# Patient Record
Sex: MALE | Race: WHITE | NOT HISPANIC OR LATINO | Employment: FULL TIME | URBAN - METROPOLITAN AREA
[De-identification: names, ages, dates, MRNs, and addresses within clinical notes are randomized per-mention and may not be internally consistent; named-entity substitution may affect disease eponyms.]

---

## 2018-12-07 ENCOUNTER — LAB (OUTPATIENT)
Dept: LAB | Facility: HOSPITAL | Age: 48
End: 2018-12-07

## 2018-12-07 ENCOUNTER — TRANSCRIBE ORDERS (OUTPATIENT)
Dept: ADMINISTRATIVE | Facility: HOSPITAL | Age: 48
End: 2018-12-07

## 2018-12-07 DIAGNOSIS — Z00.00 ROUTINE GENERAL MEDICAL EXAMINATION AT A HEALTH CARE FACILITY: ICD-10-CM

## 2018-12-07 DIAGNOSIS — Z12.5 SPECIAL SCREENING FOR MALIGNANT NEOPLASM OF PROSTATE: ICD-10-CM

## 2018-12-07 DIAGNOSIS — Z00.00 ROUTINE GENERAL MEDICAL EXAMINATION AT A HEALTH CARE FACILITY: Primary | ICD-10-CM

## 2018-12-07 LAB
ALBUMIN SERPL-MCNC: 4.9 G/DL (ref 3.5–5.2)
ALBUMIN/GLOB SERPL: 2.2 G/DL
ALP SERPL-CCNC: 74 U/L (ref 39–117)
ALT SERPL W P-5'-P-CCNC: 60 U/L (ref 1–41)
ANION GAP SERPL CALCULATED.3IONS-SCNC: 13.5 MMOL/L
AST SERPL-CCNC: 54 U/L (ref 1–40)
BASOPHILS # BLD AUTO: 0.02 10*3/MM3 (ref 0–0.2)
BASOPHILS NFR BLD AUTO: 0.3 % (ref 0–1.5)
BILIRUB SERPL-MCNC: 0.6 MG/DL (ref 0.1–1.2)
BILIRUB UR QL STRIP: NEGATIVE
BUN BLD-MCNC: 14 MG/DL (ref 6–20)
BUN/CREAT SERPL: 15.1 (ref 7–25)
CALCIUM SPEC-SCNC: 9.9 MG/DL (ref 8.6–10.5)
CHLORIDE SERPL-SCNC: 95 MMOL/L (ref 98–107)
CHOLEST SERPL-MCNC: 203 MG/DL (ref 0–200)
CLARITY UR: ABNORMAL
CO2 SERPL-SCNC: 25.5 MMOL/L (ref 22–29)
COLOR UR: ABNORMAL
CREAT BLD-MCNC: 0.93 MG/DL (ref 0.76–1.27)
DEPRECATED RDW RBC AUTO: 45.2 FL (ref 37–54)
EOSINOPHIL # BLD AUTO: 0.31 10*3/MM3 (ref 0–0.7)
EOSINOPHIL NFR BLD AUTO: 4.8 % (ref 0.3–6.2)
ERYTHROCYTE [DISTWIDTH] IN BLOOD BY AUTOMATED COUNT: 12.6 % (ref 11.5–14.5)
GFR SERPL CREATININE-BSD FRML MDRD: 86 ML/MIN/1.73
GLOBULIN UR ELPH-MCNC: 2.2 GM/DL
GLUCOSE BLD-MCNC: 148 MG/DL (ref 65–99)
GLUCOSE UR STRIP-MCNC: NEGATIVE MG/DL
HCT VFR BLD AUTO: 49.8 % (ref 40.4–52.2)
HDLC SERPL-MCNC: 79 MG/DL (ref 40–60)
HGB BLD-MCNC: 16.4 G/DL (ref 13.7–17.6)
HGB UR QL STRIP.AUTO: NEGATIVE
IMM GRANULOCYTES # BLD: 0 10*3/MM3 (ref 0–0.03)
IMM GRANULOCYTES NFR BLD: 0 % (ref 0–0.5)
KETONES UR QL STRIP: ABNORMAL
LDLC SERPL CALC-MCNC: 113 MG/DL (ref 0–100)
LDLC/HDLC SERPL: 1.43 {RATIO}
LEUKOCYTE ESTERASE UR QL STRIP.AUTO: NEGATIVE
LYMPHOCYTES # BLD AUTO: 1.56 10*3/MM3 (ref 0.9–4.8)
LYMPHOCYTES NFR BLD AUTO: 24.2 % (ref 19.6–45.3)
MCH RBC QN AUTO: 32 PG (ref 27–32.7)
MCHC RBC AUTO-ENTMCNC: 32.9 G/DL (ref 32.6–36.4)
MCV RBC AUTO: 97.1 FL (ref 79.8–96.2)
MONOCYTES # BLD AUTO: 0.75 10*3/MM3 (ref 0.2–1.2)
MONOCYTES NFR BLD AUTO: 11.6 % (ref 5–12)
NEUTROPHILS # BLD AUTO: 3.8 10*3/MM3 (ref 1.9–8.1)
NEUTROPHILS NFR BLD AUTO: 59.1 % (ref 42.7–76)
NITRITE UR QL STRIP: NEGATIVE
PH UR STRIP.AUTO: 7.5 [PH] (ref 5–8)
PLATELET # BLD AUTO: 234 10*3/MM3 (ref 140–500)
PMV BLD AUTO: 9.1 FL (ref 6–12)
POTASSIUM BLD-SCNC: 4.7 MMOL/L (ref 3.5–5.2)
PROT SERPL-MCNC: 7.1 G/DL (ref 6–8.5)
PROT UR QL STRIP: NEGATIVE
PSA SERPL-MCNC: 1.06 NG/ML (ref 0–4)
RBC # BLD AUTO: 5.13 10*6/MM3 (ref 4.6–6)
SODIUM BLD-SCNC: 134 MMOL/L (ref 136–145)
SP GR UR STRIP: 1.02 (ref 1–1.03)
TRIGL SERPL-MCNC: 55 MG/DL (ref 0–150)
UROBILINOGEN UR QL STRIP: ABNORMAL
VLDLC SERPL-MCNC: 11 MG/DL (ref 5–40)
WBC NRBC COR # BLD: 6.44 10*3/MM3 (ref 4.5–10.7)

## 2018-12-07 PROCEDURE — 85025 COMPLETE CBC W/AUTO DIFF WBC: CPT | Performed by: INTERNAL MEDICINE

## 2018-12-07 PROCEDURE — 80053 COMPREHEN METABOLIC PANEL: CPT | Performed by: INTERNAL MEDICINE

## 2018-12-07 PROCEDURE — 80061 LIPID PANEL: CPT | Performed by: INTERNAL MEDICINE

## 2018-12-07 PROCEDURE — 36415 COLL VENOUS BLD VENIPUNCTURE: CPT

## 2018-12-07 PROCEDURE — 81003 URINALYSIS AUTO W/O SCOPE: CPT | Performed by: INTERNAL MEDICINE

## 2018-12-07 PROCEDURE — G0103 PSA SCREENING: HCPCS | Performed by: INTERNAL MEDICINE

## 2019-04-19 ENCOUNTER — PREP FOR SURGERY (OUTPATIENT)
Dept: OTHER | Facility: HOSPITAL | Age: 49
End: 2019-04-19

## 2019-04-19 DIAGNOSIS — Z12.11 SCREEN FOR COLON CANCER: Primary | ICD-10-CM

## 2019-04-30 ENCOUNTER — TRANSCRIBE ORDERS (OUTPATIENT)
Dept: ADMINISTRATIVE | Facility: HOSPITAL | Age: 49
End: 2019-04-30

## 2019-04-30 ENCOUNTER — LAB (OUTPATIENT)
Dept: LAB | Facility: HOSPITAL | Age: 49
End: 2019-04-30

## 2019-04-30 DIAGNOSIS — E78.5 HYPERLIPIDEMIA, UNSPECIFIED HYPERLIPIDEMIA TYPE: Primary | ICD-10-CM

## 2019-04-30 DIAGNOSIS — E78.5 HYPERLIPIDEMIA, UNSPECIFIED HYPERLIPIDEMIA TYPE: ICD-10-CM

## 2019-04-30 LAB
ALBUMIN SERPL-MCNC: 4.4 G/DL (ref 3.5–5.2)
ALBUMIN/GLOB SERPL: 1.8 G/DL
ALP SERPL-CCNC: 66 U/L (ref 39–117)
ALT SERPL W P-5'-P-CCNC: 44 U/L (ref 1–41)
ANION GAP SERPL CALCULATED.3IONS-SCNC: 10.7 MMOL/L
AST SERPL-CCNC: 37 U/L (ref 1–40)
BILIRUB SERPL-MCNC: 0.4 MG/DL (ref 0.2–1.2)
BUN BLD-MCNC: 13 MG/DL (ref 6–20)
BUN/CREAT SERPL: 15.5 (ref 7–25)
CALCIUM SPEC-SCNC: 9.8 MG/DL (ref 8.6–10.5)
CHLORIDE SERPL-SCNC: 101 MMOL/L (ref 98–107)
CHOLEST SERPL-MCNC: 199 MG/DL (ref 0–200)
CO2 SERPL-SCNC: 27.3 MMOL/L (ref 22–29)
CREAT BLD-MCNC: 0.84 MG/DL (ref 0.76–1.27)
GFR SERPL CREATININE-BSD FRML MDRD: 96 ML/MIN/1.73
GLOBULIN UR ELPH-MCNC: 2.4 GM/DL
GLUCOSE BLD-MCNC: 177 MG/DL (ref 65–99)
HDLC SERPL-MCNC: 76 MG/DL (ref 40–60)
LDLC SERPL CALC-MCNC: 112 MG/DL (ref 0–100)
LDLC/HDLC SERPL: 1.47 {RATIO}
POTASSIUM BLD-SCNC: 4.5 MMOL/L (ref 3.5–5.2)
PROT SERPL-MCNC: 6.8 G/DL (ref 6–8.5)
SODIUM BLD-SCNC: 139 MMOL/L (ref 136–145)
TRIGL SERPL-MCNC: 57 MG/DL (ref 0–150)
VLDLC SERPL-MCNC: 11.4 MG/DL (ref 5–40)

## 2019-04-30 PROCEDURE — 36415 COLL VENOUS BLD VENIPUNCTURE: CPT

## 2019-04-30 PROCEDURE — 80053 COMPREHEN METABOLIC PANEL: CPT | Performed by: INTERNAL MEDICINE

## 2019-04-30 PROCEDURE — 80061 LIPID PANEL: CPT | Performed by: INTERNAL MEDICINE

## 2019-05-06 PROBLEM — Z12.11 SCREEN FOR COLON CANCER: Status: ACTIVE | Noted: 2019-05-06

## 2019-05-07 ENCOUNTER — LAB (OUTPATIENT)
Dept: LAB | Facility: HOSPITAL | Age: 49
End: 2019-05-07

## 2019-05-07 ENCOUNTER — TRANSCRIBE ORDERS (OUTPATIENT)
Dept: ADMINISTRATIVE | Facility: HOSPITAL | Age: 49
End: 2019-05-07

## 2019-05-07 DIAGNOSIS — E10.40 DIABETIC NEUROPATHY, TYPE I DIABETES MELLITUS (HCC): Primary | ICD-10-CM

## 2019-05-07 DIAGNOSIS — E10.40 DIABETIC NEUROPATHY, TYPE I DIABETES MELLITUS (HCC): ICD-10-CM

## 2019-05-07 LAB — HBA1C MFR BLD: 7.62 % (ref 4.8–5.6)

## 2019-05-07 PROCEDURE — 83036 HEMOGLOBIN GLYCOSYLATED A1C: CPT | Performed by: INTERNAL MEDICINE

## 2019-05-07 PROCEDURE — 36415 COLL VENOUS BLD VENIPUNCTURE: CPT

## 2019-06-28 ENCOUNTER — ANESTHESIA (OUTPATIENT)
Dept: GASTROENTEROLOGY | Facility: HOSPITAL | Age: 49
End: 2019-06-28

## 2019-06-28 ENCOUNTER — ANESTHESIA EVENT (OUTPATIENT)
Dept: GASTROENTEROLOGY | Facility: HOSPITAL | Age: 49
End: 2019-06-28

## 2019-06-28 ENCOUNTER — HOSPITAL ENCOUNTER (OUTPATIENT)
Facility: HOSPITAL | Age: 49
Setting detail: HOSPITAL OUTPATIENT SURGERY
Discharge: HOME | End: 2019-06-28
Attending: INTERNAL MEDICINE | Admitting: INTERNAL MEDICINE

## 2019-06-28 VITALS
SYSTOLIC BLOOD PRESSURE: 133 MMHG | BODY MASS INDEX: 27.44 KG/M2 | WEIGHT: 225.31 LBS | HEART RATE: 73 BPM | DIASTOLIC BLOOD PRESSURE: 77 MMHG | TEMPERATURE: 98.2 F | RESPIRATION RATE: 16 BRPM | HEIGHT: 76 IN | OXYGEN SATURATION: 97 %

## 2019-06-28 DIAGNOSIS — Z12.11 SCREEN FOR COLON CANCER: ICD-10-CM

## 2019-06-28 LAB — GLUCOSE BLDC GLUCOMTR-MCNC: 215 MG/DL (ref 70–130)

## 2019-06-28 PROCEDURE — 45380 COLONOSCOPY AND BIOPSY: CPT | Performed by: INTERNAL MEDICINE

## 2019-06-28 PROCEDURE — 25010000002 PROPOFOL 10 MG/ML EMULSION: Performed by: NURSE ANESTHETIST, CERTIFIED REGISTERED

## 2019-06-28 PROCEDURE — 82962 GLUCOSE BLOOD TEST: CPT

## 2019-06-28 PROCEDURE — 88305 TISSUE EXAM BY PATHOLOGIST: CPT | Performed by: INTERNAL MEDICINE

## 2019-06-28 PROCEDURE — S0260 H&P FOR SURGERY: HCPCS | Performed by: INTERNAL MEDICINE

## 2019-06-28 RX ORDER — LIDOCAINE HYDROCHLORIDE 20 MG/ML
INJECTION, SOLUTION INFILTRATION; PERINEURAL AS NEEDED
Status: DISCONTINUED | OUTPATIENT
Start: 2019-06-28 | End: 2019-06-28 | Stop reason: SURG

## 2019-06-28 RX ORDER — SODIUM CHLORIDE, SODIUM LACTATE, POTASSIUM CHLORIDE, CALCIUM CHLORIDE 600; 310; 30; 20 MG/100ML; MG/100ML; MG/100ML; MG/100ML
30 INJECTION, SOLUTION INTRAVENOUS CONTINUOUS PRN
Status: DISCONTINUED | OUTPATIENT
Start: 2019-06-28 | End: 2019-06-28 | Stop reason: HOSPADM

## 2019-06-28 RX ORDER — ROSUVASTATIN CALCIUM 20 MG/1
40 TABLET, COATED ORAL DAILY
COMMUNITY
End: 2019-10-03

## 2019-06-28 RX ORDER — LISINOPRIL 40 MG/1
40 TABLET ORAL DAILY
COMMUNITY

## 2019-06-28 RX ORDER — PROPOFOL 10 MG/ML
VIAL (ML) INTRAVENOUS CONTINUOUS PRN
Status: DISCONTINUED | OUTPATIENT
Start: 2019-06-28 | End: 2019-06-28 | Stop reason: SURG

## 2019-06-28 RX ORDER — PROPOFOL 10 MG/ML
VIAL (ML) INTRAVENOUS AS NEEDED
Status: DISCONTINUED | OUTPATIENT
Start: 2019-06-28 | End: 2019-06-28 | Stop reason: SURG

## 2019-06-28 RX ADMIN — SODIUM CHLORIDE, POTASSIUM CHLORIDE, SODIUM LACTATE AND CALCIUM CHLORIDE 30 ML/HR: 600; 310; 30; 20 INJECTION, SOLUTION INTRAVENOUS at 10:58

## 2019-06-28 RX ADMIN — PROPOFOL 30 MG: 10 INJECTION, EMULSION INTRAVENOUS at 11:08

## 2019-06-28 RX ADMIN — LIDOCAINE HYDROCHLORIDE 40 MG: 20 INJECTION, SOLUTION INFILTRATION; PERINEURAL at 11:06

## 2019-06-28 RX ADMIN — PROPOFOL 50 MG: 10 INJECTION, EMULSION INTRAVENOUS at 11:06

## 2019-06-28 RX ADMIN — PROPOFOL 300 MCG/KG/MIN: 10 INJECTION, EMULSION INTRAVENOUS at 11:06

## 2019-06-28 NOTE — ANESTHESIA POSTPROCEDURE EVALUATION
"Patient: Jet Deras    Procedure Summary     Date:  06/28/19 Room / Location:   [facility]    Anesthesia Start:  1104 Anesthesia Stop:  1129    Procedure:  COLONOSCOPY INTO CECUM AND TI WITH COLD BX POLYPECTOMIES (N/A ) Diagnosis:       Screen for colon cancer      (Screen for colon cancer [Z12.11])    Surgeon:  Yaya Del Valle MD Provider:  Kamlesh Chaudhry MD    Anesthesia Type:  MAC ASA Status:  2          Anesthesia Type: MAC  Last vitals  BP   125/75 (06/28/19 1140)   Temp   36.8 °C (98.2 °F) (06/28/19 1130)   Pulse   76 (06/28/19 1140)   Resp   16 (06/28/19 1140)     SpO2   97 % (06/28/19 1140)     Post Anesthesia Care and Evaluation    Patient location during evaluation: PACU  Patient participation: complete - patient participated  Level of consciousness: awake  Pain score: 0  Pain management: adequate  Airway patency: patent  Anesthetic complications: No anesthetic complications  PONV Status: none  Cardiovascular status: acceptable  Respiratory status: acceptable  Hydration status: acceptable    Comments: /75   Pulse 76   Temp 36.8 °C (98.2 °F)   Resp 16   Ht 193 cm (76\")   Wt 102 kg (225 lb 5 oz)   SpO2 97%   BMI 27.43 kg/m²       "

## 2019-06-28 NOTE — ANESTHESIA PREPROCEDURE EVALUATION
Anesthesia Evaluation     Patient summary reviewed and Nursing notes reviewed   NPO Solid Status: > 8 hours  NPO Liquid Status: > 4 hours           Airway   Mallampati: I  TM distance: >3 FB  Neck ROM: full  No difficulty expected  Dental - normal exam     Pulmonary - negative pulmonary ROS and normal exam   Cardiovascular - negative cardio ROS and normal exam  Exercise tolerance: excellent (>7 METS)        Neuro/Psych- negative ROS  GI/Hepatic/Renal/Endo    (+)   diabetes mellitus,     Musculoskeletal (-) negative ROS    Abdominal  - normal exam    Bowel sounds: normal.   Substance History - negative use     OB/GYN negative ob/gyn ROS         Other                        Anesthesia Plan    ASA 2     MAC     Anesthetic plan, all risks, benefits, and alternatives have been provided, discussed and informed consent has been obtained with: patient.

## 2019-07-01 LAB
CYTO UR: NORMAL
LAB AP CASE REPORT: NORMAL
PATH REPORT.FINAL DX SPEC: NORMAL
PATH REPORT.GROSS SPEC: NORMAL

## 2019-07-09 ENCOUNTER — TELEPHONE (OUTPATIENT)
Dept: GASTROENTEROLOGY | Facility: CLINIC | Age: 49
End: 2019-07-09

## 2019-07-09 NOTE — TELEPHONE ENCOUNTER
Called pt and left  for pt to call back.     C/s placed in recall for 06/28/2020.    Copy of path report faxed to Dr Hawkins thru [facility].

## 2019-07-09 NOTE — TELEPHONE ENCOUNTER
----- Message from Yaya Del Valle MD sent at 7/4/2019  4:43 PM EDT -----  Tell him that the colon polyps that were removed were not cancerous but the cluster of small colon polyps were precancerous. I recommend that the colonoscopy be repeated in one year to make sure that the ascending colon cluster of precanerous polyps was in fact completely removed. FAX to his PCP. Thx. kjh

## 2019-07-16 NOTE — TELEPHONE ENCOUNTER
Returned pt's call .  Pt states he did not listen to his vm. Advised per Dr Del Valle of the results below.  Pt verb understanding.

## 2019-07-16 NOTE — TELEPHONE ENCOUNTER
Returned pt's call and on identified vm advised per Dr Del Valle that the colon polyps that were removed were not cancerous but the cluster of sm colon polyps were preancerous.  He recommends that the c/s be repeated in 1 yr to make sure that the ascending colon cluster of precancerous polyps was in fact completely removed .  Advised to call with questions.

## 2019-09-12 ENCOUNTER — LAB (OUTPATIENT)
Dept: LAB | Facility: HOSPITAL | Age: 49
End: 2019-09-12

## 2019-09-12 ENCOUNTER — TRANSCRIBE ORDERS (OUTPATIENT)
Dept: ADMINISTRATIVE | Facility: HOSPITAL | Age: 49
End: 2019-09-12

## 2019-09-12 DIAGNOSIS — I10 HYPERTENSION, UNSPECIFIED TYPE: ICD-10-CM

## 2019-09-12 DIAGNOSIS — E10.40 DIABETIC NEUROPATHY, TYPE I DIABETES MELLITUS (HCC): Primary | ICD-10-CM

## 2019-09-12 DIAGNOSIS — E78.5 HYPERLIPIDEMIA, UNSPECIFIED HYPERLIPIDEMIA TYPE: ICD-10-CM

## 2019-09-12 DIAGNOSIS — E10.40 DIABETIC NEUROPATHY, TYPE I DIABETES MELLITUS (HCC): ICD-10-CM

## 2019-09-12 LAB
ALBUMIN SERPL-MCNC: 4.5 G/DL (ref 3.5–5.2)
ALBUMIN/GLOB SERPL: 1.7 G/DL
ALP SERPL-CCNC: 72 U/L (ref 39–117)
ALT SERPL W P-5'-P-CCNC: 43 U/L (ref 1–41)
ANION GAP SERPL CALCULATED.3IONS-SCNC: 13.7 MMOL/L (ref 5–15)
AST SERPL-CCNC: 41 U/L (ref 1–40)
BILIRUB SERPL-MCNC: 0.6 MG/DL (ref 0.2–1.2)
BUN BLD-MCNC: 11 MG/DL (ref 6–20)
BUN/CREAT SERPL: 11.8 (ref 7–25)
CALCIUM SPEC-SCNC: 9.9 MG/DL (ref 8.6–10.5)
CHLORIDE SERPL-SCNC: 97 MMOL/L (ref 98–107)
CHOLEST SERPL-MCNC: 191 MG/DL (ref 0–200)
CO2 SERPL-SCNC: 27.3 MMOL/L (ref 22–29)
CREAT BLD-MCNC: 0.93 MG/DL (ref 0.76–1.27)
GFR SERPL CREATININE-BSD FRML MDRD: 86 ML/MIN/1.73
GLOBULIN UR ELPH-MCNC: 2.7 GM/DL
GLUCOSE BLD-MCNC: 219 MG/DL (ref 65–99)
HBA1C MFR BLD: 7.48 % (ref 4.8–5.6)
HDLC SERPL-MCNC: 79 MG/DL (ref 40–60)
LDLC SERPL CALC-MCNC: 98 MG/DL (ref 0–100)
LDLC/HDLC SERPL: 1.24 {RATIO}
POTASSIUM BLD-SCNC: 4.9 MMOL/L (ref 3.5–5.2)
PROT SERPL-MCNC: 7.2 G/DL (ref 6–8.5)
SODIUM BLD-SCNC: 138 MMOL/L (ref 136–145)
TRIGL SERPL-MCNC: 71 MG/DL (ref 0–150)
TSH SERPL DL<=0.05 MIU/L-ACNC: 1.07 UIU/ML (ref 0.27–4.2)
VLDLC SERPL-MCNC: 14.2 MG/DL (ref 5–40)

## 2019-09-12 PROCEDURE — 36415 COLL VENOUS BLD VENIPUNCTURE: CPT

## 2019-09-12 PROCEDURE — 80061 LIPID PANEL: CPT | Performed by: INTERNAL MEDICINE

## 2019-09-12 PROCEDURE — 84443 ASSAY THYROID STIM HORMONE: CPT | Performed by: INTERNAL MEDICINE

## 2019-09-12 PROCEDURE — 83036 HEMOGLOBIN GLYCOSYLATED A1C: CPT | Performed by: INTERNAL MEDICINE

## 2019-09-12 PROCEDURE — 80053 COMPREHEN METABOLIC PANEL: CPT | Performed by: INTERNAL MEDICINE

## 2019-10-03 ENCOUNTER — HOSPITAL ENCOUNTER (OUTPATIENT)
Facility: HOSPITAL | Age: 49
Setting detail: OBSERVATION
Discharge: HOME | End: 2019-10-04
Attending: EMERGENCY MEDICINE | Admitting: INTERNAL MEDICINE

## 2019-10-03 ENCOUNTER — APPOINTMENT (OUTPATIENT)
Dept: GENERAL RADIOLOGY | Facility: HOSPITAL | Age: 49
End: 2019-10-03

## 2019-10-03 DIAGNOSIS — E87.1 HYPONATREMIA: ICD-10-CM

## 2019-10-03 DIAGNOSIS — E10.10 TYPE 1 DIABETES MELLITUS WITH KETOACIDOSIS WITHOUT COMA (HCC): Primary | ICD-10-CM

## 2019-10-03 DIAGNOSIS — T88.7XXA SIDE EFFECT OF DRUG: ICD-10-CM

## 2019-10-03 DIAGNOSIS — E87.5 HYPERKALEMIA: ICD-10-CM

## 2019-10-03 LAB
ALBUMIN SERPL-MCNC: 4.6 G/DL (ref 3.5–5.2)
ALBUMIN/GLOB SERPL: 1.7 G/DL
ALP SERPL-CCNC: 92 U/L (ref 39–117)
ALT SERPL W P-5'-P-CCNC: 52 U/L (ref 1–41)
ANION GAP SERPL CALCULATED.3IONS-SCNC: 32.4 MMOL/L (ref 5–15)
ANION GAP SERPL CALCULATED.3IONS-SCNC: 32.5 MMOL/L (ref 5–15)
ANION GAP SERPL CALCULATED.3IONS-SCNC: 34.9 MMOL/L (ref 5–15)
ANION GAP SERPL CALCULATED.3IONS-SCNC: 35.2 MMOL/L (ref 5–15)
AST SERPL-CCNC: 41 U/L (ref 1–40)
B-OH-BUTYR SERPL-SCNC: 10.79 MMOL/L (ref 0.02–0.27)
BACTERIA UR QL AUTO: NORMAL /HPF
BASOPHILS # BLD AUTO: 0.05 10*3/MM3 (ref 0–0.2)
BASOPHILS NFR BLD AUTO: 0.5 % (ref 0–1.5)
BILIRUB SERPL-MCNC: 0.6 MG/DL (ref 0.2–1.2)
BILIRUB UR QL STRIP: NEGATIVE
BUN BLD-MCNC: 24 MG/DL (ref 6–20)
BUN BLD-MCNC: 25 MG/DL (ref 6–20)
BUN BLD-MCNC: 27 MG/DL (ref 6–20)
BUN BLD-MCNC: 28 MG/DL (ref 6–20)
BUN/CREAT SERPL: 20.8 (ref 7–25)
BUN/CREAT SERPL: 21.1 (ref 7–25)
BUN/CREAT SERPL: 21.5 (ref 7–25)
BUN/CREAT SERPL: 22.3 (ref 7–25)
CALCIUM SPEC-SCNC: 10 MG/DL (ref 8.6–10.5)
CALCIUM SPEC-SCNC: 10.1 MG/DL (ref 8.6–10.5)
CALCIUM SPEC-SCNC: 8.4 MG/DL (ref 8.6–10.5)
CALCIUM SPEC-SCNC: 8.6 MG/DL (ref 8.6–10.5)
CHLORIDE SERPL-SCNC: 82 MMOL/L (ref 98–107)
CHLORIDE SERPL-SCNC: 82 MMOL/L (ref 98–107)
CHLORIDE SERPL-SCNC: 89 MMOL/L (ref 98–107)
CHLORIDE SERPL-SCNC: 92 MMOL/L (ref 98–107)
CLARITY UR: CLEAR
CO2 SERPL-SCNC: 10.8 MMOL/L (ref 22–29)
CO2 SERPL-SCNC: 12.6 MMOL/L (ref 22–29)
CO2 SERPL-SCNC: 7.1 MMOL/L (ref 22–29)
CO2 SERPL-SCNC: 9.5 MMOL/L (ref 22–29)
COLOR UR: YELLOW
CREAT BLD-MCNC: 1.12 MG/DL (ref 0.76–1.27)
CREAT BLD-MCNC: 1.14 MG/DL (ref 0.76–1.27)
CREAT BLD-MCNC: 1.3 MG/DL (ref 0.76–1.27)
CREAT BLD-MCNC: 1.3 MG/DL (ref 0.76–1.27)
DEPRECATED RDW RBC AUTO: 43.1 FL (ref 37–54)
EOSINOPHIL # BLD AUTO: 0 10*3/MM3 (ref 0–0.4)
EOSINOPHIL NFR BLD AUTO: 0 % (ref 0.3–6.2)
ERYTHROCYTE [DISTWIDTH] IN BLOOD BY AUTOMATED COUNT: 11.9 % (ref 12.3–15.4)
GFR SERPL CREATININE-BSD FRML MDRD: 58 ML/MIN/1.73
GFR SERPL CREATININE-BSD FRML MDRD: 58 ML/MIN/1.73
GFR SERPL CREATININE-BSD FRML MDRD: 68 ML/MIN/1.73
GFR SERPL CREATININE-BSD FRML MDRD: 69 ML/MIN/1.73
GLOBULIN UR ELPH-MCNC: 2.7 GM/DL
GLUCOSE BLD-MCNC: 228 MG/DL (ref 65–99)
GLUCOSE BLD-MCNC: 230 MG/DL (ref 65–99)
GLUCOSE BLD-MCNC: 257 MG/DL (ref 65–99)
GLUCOSE BLD-MCNC: 264 MG/DL (ref 65–99)
GLUCOSE BLDC GLUCOMTR-MCNC: 178 MG/DL (ref 70–130)
GLUCOSE BLDC GLUCOMTR-MCNC: 234 MG/DL (ref 70–130)
GLUCOSE BLDC GLUCOMTR-MCNC: 264 MG/DL (ref 70–130)
GLUCOSE BLDC GLUCOMTR-MCNC: 288 MG/DL (ref 70–130)
GLUCOSE UR STRIP-MCNC: ABNORMAL MG/DL
HBA1C MFR BLD: 7.1 % (ref 4.8–5.6)
HCT VFR BLD AUTO: 47.7 % (ref 37.5–51)
HGB BLD-MCNC: 15.6 G/DL (ref 13–17.7)
HGB UR QL STRIP.AUTO: NEGATIVE
HYALINE CASTS UR QL AUTO: NORMAL /LPF
IMM GRANULOCYTES # BLD AUTO: 0.08 10*3/MM3 (ref 0–0.05)
IMM GRANULOCYTES NFR BLD AUTO: 0.8 % (ref 0–0.5)
KETONES UR QL STRIP: ABNORMAL
LEUKOCYTE ESTERASE UR QL STRIP.AUTO: NEGATIVE
LYMPHOCYTES # BLD AUTO: 1.19 10*3/MM3 (ref 0.7–3.1)
LYMPHOCYTES NFR BLD AUTO: 12.2 % (ref 19.6–45.3)
MAGNESIUM SERPL-MCNC: 2.4 MG/DL (ref 1.6–2.6)
MAGNESIUM SERPL-MCNC: 2.6 MG/DL (ref 1.6–2.6)
MAGNESIUM SERPL-MCNC: 2.8 MG/DL (ref 1.6–2.6)
MCH RBC QN AUTO: 32 PG (ref 26.6–33)
MCHC RBC AUTO-ENTMCNC: 32.7 G/DL (ref 31.5–35.7)
MCV RBC AUTO: 97.9 FL (ref 79–97)
MONOCYTES # BLD AUTO: 0.5 10*3/MM3 (ref 0.1–0.9)
MONOCYTES NFR BLD AUTO: 5.1 % (ref 5–12)
NEUTROPHILS # BLD AUTO: 7.97 10*3/MM3 (ref 1.7–7)
NEUTROPHILS NFR BLD AUTO: 81.4 % (ref 42.7–76)
NITRITE UR QL STRIP: NEGATIVE
NRBC BLD AUTO-RTO: 0 /100 WBC (ref 0–0.2)
NT-PROBNP SERPL-MCNC: 19.5 PG/ML (ref 5–900)
OSMOLALITY SERPL: 305 MOSM/KG (ref 275–300)
PH UR STRIP.AUTO: <=5 [PH] (ref 5–8)
PHOSPHATE SERPL-MCNC: 5.3 MG/DL (ref 2.5–4.5)
PHOSPHATE SERPL-MCNC: 5.6 MG/DL (ref 2.5–4.5)
PHOSPHATE SERPL-MCNC: 5.7 MG/DL (ref 2.5–4.5)
PLATELET # BLD AUTO: 246 10*3/MM3 (ref 140–450)
PMV BLD AUTO: 9.2 FL (ref 6–12)
POTASSIUM BLD-SCNC: 4.7 MMOL/L (ref 3.5–5.2)
POTASSIUM BLD-SCNC: 4.9 MMOL/L (ref 3.5–5.2)
POTASSIUM BLD-SCNC: 5.4 MMOL/L (ref 3.5–5.2)
POTASSIUM BLD-SCNC: 5.5 MMOL/L (ref 3.5–5.2)
PROT SERPL-MCNC: 7.3 G/DL (ref 6–8.5)
PROT UR QL STRIP: ABNORMAL
RBC # BLD AUTO: 4.87 10*6/MM3 (ref 4.14–5.8)
RBC # UR: NORMAL /HPF
REF LAB TEST METHOD: NORMAL
SODIUM BLD-SCNC: 127 MMOL/L (ref 136–145)
SODIUM BLD-SCNC: 128 MMOL/L (ref 136–145)
SODIUM BLD-SCNC: 131 MMOL/L (ref 136–145)
SODIUM BLD-SCNC: 134 MMOL/L (ref 136–145)
SP GR UR STRIP: 1.03 (ref 1–1.03)
SQUAMOUS #/AREA URNS HPF: NORMAL /HPF
TROPONIN T SERPL-MCNC: <0.01 NG/ML (ref 0–0.03)
UROBILINOGEN UR QL STRIP: ABNORMAL
WBC NRBC COR # BLD: 9.79 10*3/MM3 (ref 3.4–10.8)
WBC UR QL AUTO: NORMAL /HPF

## 2019-10-03 PROCEDURE — 83880 ASSAY OF NATRIURETIC PEPTIDE: CPT | Performed by: EMERGENCY MEDICINE

## 2019-10-03 PROCEDURE — 82962 GLUCOSE BLOOD TEST: CPT

## 2019-10-03 PROCEDURE — 83735 ASSAY OF MAGNESIUM: CPT | Performed by: EMERGENCY MEDICINE

## 2019-10-03 PROCEDURE — 93010 ELECTROCARDIOGRAM REPORT: CPT | Performed by: INTERNAL MEDICINE

## 2019-10-03 PROCEDURE — 85025 COMPLETE CBC W/AUTO DIFF WBC: CPT | Performed by: EMERGENCY MEDICINE

## 2019-10-03 PROCEDURE — 96365 THER/PROPH/DIAG IV INF INIT: CPT

## 2019-10-03 PROCEDURE — 36415 COLL VENOUS BLD VENIPUNCTURE: CPT

## 2019-10-03 PROCEDURE — 83036 HEMOGLOBIN GLYCOSYLATED A1C: CPT | Performed by: EMERGENCY MEDICINE

## 2019-10-03 PROCEDURE — 82010 KETONE BODYS QUAN: CPT | Performed by: EMERGENCY MEDICINE

## 2019-10-03 PROCEDURE — 83735 ASSAY OF MAGNESIUM: CPT | Performed by: INTERNAL MEDICINE

## 2019-10-03 PROCEDURE — 82803 BLOOD GASES ANY COMBINATION: CPT

## 2019-10-03 PROCEDURE — G0378 HOSPITAL OBSERVATION PER HR: HCPCS

## 2019-10-03 PROCEDURE — 96366 THER/PROPH/DIAG IV INF ADDON: CPT

## 2019-10-03 PROCEDURE — 81001 URINALYSIS AUTO W/SCOPE: CPT | Performed by: EMERGENCY MEDICINE

## 2019-10-03 PROCEDURE — 84484 ASSAY OF TROPONIN QUANT: CPT | Performed by: EMERGENCY MEDICINE

## 2019-10-03 PROCEDURE — 96375 TX/PRO/DX INJ NEW DRUG ADDON: CPT

## 2019-10-03 PROCEDURE — 93005 ELECTROCARDIOGRAM TRACING: CPT | Performed by: EMERGENCY MEDICINE

## 2019-10-03 PROCEDURE — 80053 COMPREHEN METABOLIC PANEL: CPT | Performed by: EMERGENCY MEDICINE

## 2019-10-03 PROCEDURE — 71046 X-RAY EXAM CHEST 2 VIEWS: CPT

## 2019-10-03 PROCEDURE — 25010000002 ONDANSETRON PER 1 MG: Performed by: EMERGENCY MEDICINE

## 2019-10-03 PROCEDURE — 84100 ASSAY OF PHOSPHORUS: CPT | Performed by: EMERGENCY MEDICINE

## 2019-10-03 PROCEDURE — 80048 BASIC METABOLIC PNL TOTAL CA: CPT | Performed by: EMERGENCY MEDICINE

## 2019-10-03 PROCEDURE — 99284 EMERGENCY DEPT VISIT MOD MDM: CPT

## 2019-10-03 PROCEDURE — 80048 BASIC METABOLIC PNL TOTAL CA: CPT | Performed by: INTERNAL MEDICINE

## 2019-10-03 PROCEDURE — 83930 ASSAY OF BLOOD OSMOLALITY: CPT | Performed by: EMERGENCY MEDICINE

## 2019-10-03 RX ORDER — ROSUVASTATIN CALCIUM 40 MG/1
40 TABLET, COATED ORAL DAILY
COMMUNITY

## 2019-10-03 RX ORDER — SODIUM CHLORIDE 0.9 % (FLUSH) 0.9 %
10 SYRINGE (ML) INJECTION AS NEEDED
Status: DISCONTINUED | OUTPATIENT
Start: 2019-10-03 | End: 2019-10-04 | Stop reason: HOSPADM

## 2019-10-03 RX ORDER — DEXTROSE AND SODIUM CHLORIDE 5; .45 G/100ML; G/100ML
250 INJECTION, SOLUTION INTRAVENOUS CONTINUOUS PRN
Status: DISCONTINUED | OUTPATIENT
Start: 2019-10-03 | End: 2019-10-04

## 2019-10-03 RX ORDER — DEXTROSE, SODIUM CHLORIDE, AND POTASSIUM CHLORIDE 5; .45; .3 G/100ML; G/100ML; G/100ML
250 INJECTION INTRAVENOUS CONTINUOUS PRN
Status: DISCONTINUED | OUTPATIENT
Start: 2019-10-03 | End: 2019-10-04

## 2019-10-03 RX ORDER — SODIUM CHLORIDE 0.9 % (FLUSH) 0.9 %
10 SYRINGE (ML) INJECTION EVERY 12 HOURS SCHEDULED
Status: DISCONTINUED | OUTPATIENT
Start: 2019-10-03 | End: 2019-10-04 | Stop reason: HOSPADM

## 2019-10-03 RX ORDER — DEXTROSE MONOHYDRATE 25 G/50ML
12.5 INJECTION, SOLUTION INTRAVENOUS AS NEEDED
Status: DISCONTINUED | OUTPATIENT
Start: 2019-10-03 | End: 2019-10-04 | Stop reason: HOSPADM

## 2019-10-03 RX ORDER — ONDANSETRON 2 MG/ML
4 INJECTION INTRAMUSCULAR; INTRAVENOUS ONCE
Status: COMPLETED | OUTPATIENT
Start: 2019-10-03 | End: 2019-10-03

## 2019-10-03 RX ORDER — SODIUM CHLORIDE AND POTASSIUM CHLORIDE 300; 900 MG/100ML; MG/100ML
250 INJECTION, SOLUTION INTRAVENOUS CONTINUOUS PRN
Status: DISCONTINUED | OUTPATIENT
Start: 2019-10-03 | End: 2019-10-04

## 2019-10-03 RX ORDER — SODIUM CHLORIDE AND POTASSIUM CHLORIDE 150; 450 MG/100ML; MG/100ML
250 INJECTION, SOLUTION INTRAVENOUS CONTINUOUS PRN
Status: DISCONTINUED | OUTPATIENT
Start: 2019-10-03 | End: 2019-10-04

## 2019-10-03 RX ORDER — MULTIPLE VITAMINS W/ MINERALS TAB 9MG-400MCG
1 TAB ORAL DAILY
COMMUNITY

## 2019-10-03 RX ORDER — SODIUM CHLORIDE AND POTASSIUM CHLORIDE 150; 900 MG/100ML; MG/100ML
250 INJECTION, SOLUTION INTRAVENOUS CONTINUOUS PRN
Status: DISCONTINUED | OUTPATIENT
Start: 2019-10-03 | End: 2019-10-04

## 2019-10-03 RX ORDER — SODIUM CHLORIDE 9 MG/ML
250 INJECTION, SOLUTION INTRAVENOUS CONTINUOUS PRN
Status: DISCONTINUED | OUTPATIENT
Start: 2019-10-03 | End: 2019-10-04

## 2019-10-03 RX ORDER — SODIUM CHLORIDE 0.9 % (FLUSH) 0.9 %
10 SYRINGE (ML) INJECTION ONCE AS NEEDED
Status: DISCONTINUED | OUTPATIENT
Start: 2019-10-03 | End: 2019-10-04 | Stop reason: HOSPADM

## 2019-10-03 RX ORDER — DEXTROSE, SODIUM CHLORIDE, AND POTASSIUM CHLORIDE 5; .45; .15 G/100ML; G/100ML; G/100ML
75 INJECTION INTRAVENOUS CONTINUOUS PRN
Status: DISCONTINUED | OUTPATIENT
Start: 2019-10-03 | End: 2019-10-04 | Stop reason: HOSPADM

## 2019-10-03 RX ORDER — SODIUM CHLORIDE 9 MG/ML
10 INJECTION, SOLUTION INTRAVENOUS CONTINUOUS PRN
Status: DISCONTINUED | OUTPATIENT
Start: 2019-10-03 | End: 2019-10-04 | Stop reason: HOSPADM

## 2019-10-03 RX ORDER — DEXTROSE AND SODIUM CHLORIDE 5; .45 G/100ML; G/100ML
250 INJECTION, SOLUTION INTRAVENOUS CONTINUOUS PRN
Status: DISCONTINUED | OUTPATIENT
Start: 2019-10-03 | End: 2019-10-04 | Stop reason: HOSPADM

## 2019-10-03 RX ORDER — SODIUM CHLORIDE 450 MG/100ML
250 INJECTION, SOLUTION INTRAVENOUS CONTINUOUS PRN
Status: DISCONTINUED | OUTPATIENT
Start: 2019-10-03 | End: 2019-10-04

## 2019-10-03 RX ADMIN — ONDANSETRON 4 MG: 2 INJECTION INTRAMUSCULAR; INTRAVENOUS at 19:04

## 2019-10-03 RX ADMIN — POTASSIUM CHLORIDE, DEXTROSE MONOHYDRATE AND SODIUM CHLORIDE 250 ML/HR: 150; 5; 450 INJECTION, SOLUTION INTRAVENOUS at 23:57

## 2019-10-03 RX ADMIN — SODIUM CHLORIDE 1000 ML: 9 INJECTION, SOLUTION INTRAVENOUS at 17:58

## 2019-10-03 RX ADMIN — SODIUM CHLORIDE, PRESERVATIVE FREE 10 ML: 5 INJECTION INTRAVENOUS at 21:54

## 2019-10-03 RX ADMIN — SODIUM CHLORIDE 2000 ML: 9 INJECTION, SOLUTION INTRAVENOUS at 19:22

## 2019-10-03 RX ADMIN — SODIUM CHLORIDE 12 UNITS/HR: 9 INJECTION, SOLUTION INTRAVENOUS at 20:03

## 2019-10-03 NOTE — H&P
Group: [facility]         H/p  NOTE    Patient Identification:  Jet Deras  49 y.o.  male  1970  [id number]               CC: Shortness of breath lightheadedness    History of Present Illness:  Very pleasant 49-year-old diabetic with no previous history of DKA.  Reports this morning had episodes of lightheadedness shortness of breath and diaphoresis.  He has had some nausea also.  No vomiting reported no diarrhea reported.  He has been type I diabetic and recently started on Farxiga 2 weeks ago.  Denies any active chest pain.  He has currently an insulin pump also.  His endocrinologist does not come to [facility].  No fever no chills no chest pain no alcohol or drug abuse.      Review of Systems  As above rest of the 12 point review of system negative  Past Medical History:  Past Medical History:   Diagnosis Date   • Diabetes mellitus (CMS/HCC)    • Hyperlipidemia    • Hypertension        Past Surgical History:  Past Surgical History:   Procedure Laterality Date   • COLONOSCOPY N/A 6/28/2019    Procedure: COLONOSCOPY INTO CECUM AND TI WITH COLD BX POLYPECTOMIES;  Surgeon: Yaya Del Valle MD;  Location: [facility] ENDOSCOPY;  Service: Gastroenterology   • KNEE SURGERY      left   • NOSE SURGERY     • TONSILLECTOMY          Home Meds:    (Not in a hospital admission)    Allergies:  No Known Allergies    Social History:   Social History     Socioeconomic History   • Marital status:      Spouse name: Not on file   • Number of children: Not on file   • Years of education: Not on file   • Highest education level: Not on file   Tobacco Use   • Smoking status: Never Smoker   • Smokeless tobacco: Never Used   Substance and Sexual Activity   • Alcohol use: Yes     Comment: weekly   • Drug use: No   • Sexual activity: Defer       Family History:  History reviewed. No pertinent family history.    Physical Exam:  BP 93/46 (Patient Position: Lying)   Pulse 68   Temp 97.5 °F (36.4 °C) (Tympanic)   Resp  "20   Ht 193 cm (76\")   Wt 126 kg (277 lb 11.2 oz)   SpO2 100%   BMI 33.80 kg/m²  Body mass index is 33.8 kg/m². 100% 126 kg (277 lb 11.2 oz)  Physical Exam  Middle-aged gentleman currently in no distress  Well-developed normal body habitus  Eyes normal conjunctive a pupils reactive to light  ENT Mallampati between 3 and 4 normal nasal exam  Dry oral mucous membrane  Neck midline trachea no thyromegaly  Chest diminished breath sounds no crackles no wheezing no labored breathing  CVs regular rate and rhythm no lower extremity edema  Abdomen soft nontender no hepatosplenomegaly  CNS intact normal sensory exam  Skin no rashes no nodules  Psych oriented to time place and person normal memory  Muscular skeletal no cyanosis no clubbing normal range of motion    LABS:  Lab Results   Component Value Date    CALCIUM 10.0 10/03/2019     Results from last 7 days   Lab Units 10/03/19  1744   SODIUM mmol/L 127*   POTASSIUM mmol/L 5.4*   CHLORIDE mmol/L 82*   CO2 mmol/L 12.6*   BUN mg/dL 24*   CREATININE mg/dL 1.14   GLUCOSE mg/dL 257*   CALCIUM mg/dL 10.0   WBC 10*3/mm3 9.79   HEMOGLOBIN g/dL 15.6   PLATELETS 10*3/mm3 246   PROBNP pg/mL 19.5     Lab Results   Component Value Date    TROPONINT <0.010 10/03/2019     Results from last 7 days   Lab Units 10/03/19  1744   TROPONIN T ng/mL <0.010                             Lab Results   Component Value Date    TSH 1.070 09/12/2019     Estimated Creatinine Clearance: 110.6 mL/min (by C-G formula based on SCr of 1.14 mg/dL).         Imaging: I personally visualized the images of scans/x-rays performed within last 3 days.  Chest x-ray reviewed no active disease noted    Assessment:  DKA  Type 1 diabetes mellitus  Metabolic acidosis due to above  Hypotension    Recommendations:  At this minor gentleman with type 1 diabetes mellitus presenting with diabetic ketoacidosis as evidenced by high anion gap metabolic acidosis with positive hydroxybutyrate acid.  Will initiate DKA " protocol  Resuscitated with fluids and pressors if needed.  Clinically appears to be volume depleted.  Monitor electrolytes closely per DKA protocol  No evidence to suggest infection  There is some supporting literature for farciga causing DKA.  We will ask endocrine to see in the morning  Discussed with wife and family in detail      Critical care time 35 minutes            Eve Barney MD  10/3/2019  7:13 PM      Much of this encounter note is an electronic transcription/translation of spoken language to printed text using Dragon Software.

## 2019-10-03 NOTE — ED PROVIDER NOTES
EMERGENCY DEPARTMENT ENCOUNTER    Room Number:  10/10  Date of encounter:  10/3/2019  PCP: Dima Hawkins MD  Historian: Patient, patient's wife      HPI:  Chief Complaint: SOA  A complete HPI/ROS/PMH/PSH/SH/FH are unobtainable due to: NA    Context: Jet Deras is a 49 y.o. male who presents to the ED c/o sudden onset of shortness of breath, lightheadedness, and diaphoresis that began while packing for an upcoming weekend trip this evening. Symptoms worsened as patient continued to pack. Denies any chest pain or nausea during episode. Patient also reports that he has felt increased thirst today. He is a type I diabetic and recently started on Farxiga 2 weeks ago. Patient reports his BS was in 270s prior to coming to ED and this is abnormal for him. No known fevers. No recent illnesses. Nonsmoker. Denies drug or alcohol use.       PAST MEDICAL HISTORY  Active Ambulatory Problems     Diagnosis Date Noted   • Screen for colon cancer 05/06/2019     Resolved Ambulatory Problems     Diagnosis Date Noted   • No Resolved Ambulatory Problems     Past Medical History:   Diagnosis Date   • Diabetes mellitus (CMS/HCC)    • Hyperlipidemia    • Hypertension          PAST SURGICAL HISTORY  Past Surgical History:   Procedure Laterality Date   • COLONOSCOPY N/A 6/28/2019    Procedure: COLONOSCOPY INTO CECUM AND TI WITH COLD BX POLYPECTOMIES;  Surgeon: Yaya Del Valle MD;  Location: [facility] ENDOSCOPY;  Service: Gastroenterology   • KNEE SURGERY      left   • NOSE SURGERY     • TONSILLECTOMY           FAMILY HISTORY  History reviewed. No pertinent family history.      SOCIAL HISTORY  Social History     Socioeconomic History   • Marital status:      Spouse name: Not on file   • Number of children: Not on file   • Years of education: Not on file   • Highest education level: Not on file   Tobacco Use   • Smoking status: Never Smoker   • Smokeless tobacco: Never Used   Substance and Sexual Activity   • Alcohol use: Yes      Comment: weekly   • Drug use: No   • Sexual activity: Defer         ALLERGIES  Patient has no known allergies.        REVIEW OF SYSTEMS  Review of Systems   All systems reviewed and negative except for those discussed in HPI.       PHYSICAL EXAM    I have reviewed the triage vital signs and nursing notes.    ED Triage Vitals   Temp Heart Rate Resp BP SpO2   10/03/19 1447 10/03/19 1447 10/03/19 1447 10/03/19 1719 10/03/19 1447   97.5 °F (36.4 °C) 83 18 141/67 98 %      Temp src Heart Rate Source Patient Position BP Location FiO2 (%)   10/03/19 1447 10/03/19 1719 10/03/19 1719 10/03/19 1719 --   Tympanic Monitor Lying Left arm        Physical Exam  GENERAL: not distressed  HENT: nares patent  EYES: no scleral icterus  CV: regular rhythm, regular rate. 2+ radial pulses bilaterally  RESPIRATORY: normal effort. Lungs CTAB  ABDOMEN: soft  MUSCULOSKELETAL: no deformity  NEURO: alert, moves all extremities, follows commands  SKIN: warm, dry        LAB RESULTS  Recent Results (from the past 24 hour(s))   Basic Metabolic Panel    Collection Time: 10/03/19  5:44 PM   Result Value Ref Range    Glucose 257 (H) 65 - 99 mg/dL    BUN 24 (H) 6 - 20 mg/dL    Creatinine 1.14 0.76 - 1.27 mg/dL    Sodium 127 (L) 136 - 145 mmol/L    Potassium 5.4 (H) 3.5 - 5.2 mmol/L    Chloride 82 (L) 98 - 107 mmol/L    CO2 12.6 (L) 22.0 - 29.0 mmol/L    Calcium 10.0 8.6 - 10.5 mg/dL    eGFR Non African Amer 68 >60 mL/min/1.73    BUN/Creatinine Ratio 21.1 7.0 - 25.0    Anion Gap 32.4 (H) 5.0 - 15.0 mmol/L   BNP    Collection Time: 10/03/19  5:44 PM   Result Value Ref Range    proBNP 19.5 5.0 - 900.0 pg/mL   Troponin    Collection Time: 10/03/19  5:44 PM   Result Value Ref Range    Troponin T <0.010 0.000 - 0.030 ng/mL   CBC Auto Differential    Collection Time: 10/03/19  5:44 PM   Result Value Ref Range    WBC 9.79 3.40 - 10.80 10*3/mm3    RBC 4.87 4.14 - 5.80 10*6/mm3    Hemoglobin 15.6 13.0 - 17.7 g/dL    Hematocrit 47.7 37.5 - 51.0 %    MCV 97.9 (H)  79.0 - 97.0 fL    MCH 32.0 26.6 - 33.0 pg    MCHC 32.7 31.5 - 35.7 g/dL    RDW 11.9 (L) 12.3 - 15.4 %    RDW-SD 43.1 37.0 - 54.0 fl    MPV 9.2 6.0 - 12.0 fL    Platelets 246 140 - 450 10*3/mm3    Neutrophil % 81.4 (H) 42.7 - 76.0 %    Lymphocyte % 12.2 (L) 19.6 - 45.3 %    Monocyte % 5.1 5.0 - 12.0 %    Eosinophil % 0.0 (L) 0.3 - 6.2 %    Basophil % 0.5 0.0 - 1.5 %    Immature Grans % 0.8 (H) 0.0 - 0.5 %    Neutrophils, Absolute 7.97 (H) 1.70 - 7.00 10*3/mm3    Lymphocytes, Absolute 1.19 0.70 - 3.10 10*3/mm3    Monocytes, Absolute 0.50 0.10 - 0.90 10*3/mm3    Eosinophils, Absolute 0.00 0.00 - 0.40 10*3/mm3    Basophils, Absolute 0.05 0.00 - 0.20 10*3/mm3    Immature Grans, Absolute 0.08 (H) 0.00 - 0.05 10*3/mm3    nRBC 0.0 0.0 - 0.2 /100 WBC   Beta Hydroxybutyrate Quantitative    Collection Time: 10/03/19  5:44 PM   Result Value Ref Range    Beta-Hydroxybutyrate Quant 10.794 (H) 0.020 - 0.270 mmol/L       Ordered the above labs and independently reviewed the results.        RADIOLOGY  Xr Chest 2 View    Result Date: 10/3/2019  AP AND LATERAL CHEST  HISTORY: Shortness of air. Sudden onset of diaphoresis.  COMPARISON: PA and lateral chest 05/05/2011.  FINDINGS: Cardiomediastinal silhouette is within normal limits. Lungs appear clear and there is no evidence for pulmonary edema or pleural effusion or infiltrate or active disease in the chest.      No acute disease.  This report was finalized on 10/3/2019 6:50 PM by Dr. Colin Giles M.D.        I ordered the above noted radiological studies. Reviewed by me and discussed with radiologist.  See dictation for official radiology interpretation.      PROCEDURES    Critical Care  Performed by: Wilfredo Beatty II, MD  Authorized by: Wilfredo Beatty II, MD     Critical care provider statement:     Critical care time (minutes):  35    Critical care time was exclusive of:  Separately billable procedures and treating other patients    Critical care was necessary to  treat or prevent imminent or life-threatening deterioration of the following conditions:  Metabolic crisis    Critical care was time spent personally by me on the following activities:  Blood draw for specimens, development of treatment plan with patient or surrogate, discussions with consultants, evaluation of patient's response to treatment, examination of patient, obtaining history from patient or surrogate, ordering and performing treatments and interventions, ordering and review of laboratory studies, re-evaluation of patient's condition, review of old charts, ordering and review of radiographic studies and pulse oximetry              MEDICATIONS GIVEN IN ER    Medications   sodium chloride 0.9 % flush 10 mL (not administered)   sodium chloride 0.9 % bolus 2,000 mL (not administered)   sodium chloride 0.9 % infusion (not administered)   sodium chloride 0.9 % with KCl 20 mEq/L infusion (not administered)   sodium chloride 0.9 % with KCl 40 mEq/L infusion (not administered)   sodium chloride 0.45 % infusion (not administered)   sodium chloride 0.45 % with KCl 20 mEq/L infusion (not administered)   sodium chloride 0.45 % 1,000 mL with potassium chloride 40 mEq infusion (not administered)   dextrose 5 % and sodium chloride 0.45 % infusion (not administered)   dextrose 5 % and sodium chloride 0.45 % with KCl 20 mEq/L infusion (not administered)   dextrose 5 % and sodium chloride 0.45 % with KCl 40 mEq/L infusion (not administered)   insulin regular (HumuLIN R,NovoLIN R) 100 Units in sodium chloride 0.9 % 100 mL (1 Units/mL) infusion (not administered)   dextrose (D50W) 25 g/ 50mL Intravenous Solution 12.5 g (not administered)   sodium chloride 0.9 % flush 10 mL (not administered)   sodium chloride 0.9 % infusion (not administered)   sodium chloride 0.9 % bolus 1,000 mL (1,000 mL Intravenous New Bag 10/3/19 9349)   ondansetron (ZOFRAN) injection 4 mg (4 mg Intravenous Given 10/3/19 1904)         PROGRESS, DATA  ANALYSIS, CONSULTS, AND MEDICAL DECISION MAKING    All labs have been independently reviewed by me.  All radiology studies have been reviewed by me and discussed with radiologist dictating the report.   EKG's independently viewed and interpreted by me.  Discussion below represents my analysis of pertinent findings related to patient's condition, differential diagnosis, treatment plan and final disposition.      ED Course as of Oct 03 1908   u Oct 03, 2019   1744 EKG interpreted by myself.  Time 1726.  Sinus rhythm.  Heart rate 73.  Normal intervals and axis.  Using TP segment as baseline, there is no ST elevation that is greater than 1 mm in any lead.  [TD]   1851 Beta-Hydroxybutyrate Quant: (!) 10.794 [TD]   1851 Anion Gap: (!) 32.4 [TD]   1851 Potassium: (!) 5.4 [TD]   1857 CO2: (!) 12.6 [TD]   1857 Chest x-ray shows no active disease.  [TD]   1858 Corrected sodium is 130  [TD]   1906 I spoke with Dr. Barney.  He will admit the patient to the ICU.  [TD]      ED Course User Index  [TD] Wilfredo Beatty II, MD       AS OF 7:08 PM VITALS:    BP - 107/42  HR - 66  TEMP - 97.5 °F (36.4 °C) (Tympanic)  02 SATS - 96%        DIAGNOSIS  Final diagnoses:   Type 1 diabetes mellitus with ketoacidosis without coma (CMS/HCC)   Side effect of drug (Farxiga)   Hyperkalemia   Hyponatremia         DISPOSITION  ADMISSION    Discussed treatment plan and reason for admission with pt/family and admitting physician.  Pt/family voiced understanding of the plan for admission for further testing/treatment as needed.           Documentation assistance provided by torres Paulson for Dr. Wilfredo Beatty MD.  Information recorded by the torres was done at my direction and has been verified and validated by me.                Courtney Paulson  10/03/19 1908       Wilfredo Beatty II, MD  10/03/19 1939

## 2019-10-04 VITALS
SYSTOLIC BLOOD PRESSURE: 126 MMHG | WEIGHT: 226.63 LBS | RESPIRATION RATE: 16 BRPM | OXYGEN SATURATION: 97 % | TEMPERATURE: 98.2 F | HEIGHT: 76 IN | DIASTOLIC BLOOD PRESSURE: 68 MMHG | HEART RATE: 83 BPM | BODY MASS INDEX: 27.6 KG/M2

## 2019-10-04 PROBLEM — I10 ESSENTIAL HYPERTENSION: Status: ACTIVE | Noted: 2019-10-04

## 2019-10-04 PROBLEM — E78.5 HYPERLIPIDEMIA: Status: ACTIVE | Noted: 2019-10-04

## 2019-10-04 PROBLEM — Z96.41 INSULIN PUMP STATUS: Status: ACTIVE | Noted: 2019-10-04

## 2019-10-04 LAB
ALBUMIN UR-MCNC: <1.2 MG/DL
ANION GAP SERPL CALCULATED.3IONS-SCNC: 12.3 MMOL/L (ref 5–15)
ANION GAP SERPL CALCULATED.3IONS-SCNC: 18.6 MMOL/L (ref 5–15)
BUN BLD-MCNC: 18 MG/DL (ref 6–20)
BUN BLD-MCNC: 23 MG/DL (ref 6–20)
BUN/CREAT SERPL: 20.5 (ref 7–25)
BUN/CREAT SERPL: 21.7 (ref 7–25)
CALCIUM SPEC-SCNC: 7.7 MG/DL (ref 8.6–10.5)
CALCIUM SPEC-SCNC: 8 MG/DL (ref 8.6–10.5)
CHLORIDE SERPL-SCNC: 100 MMOL/L (ref 98–107)
CHLORIDE SERPL-SCNC: 97 MMOL/L (ref 98–107)
CHOLEST SERPL-MCNC: 135 MG/DL (ref 0–200)
CO2 SERPL-SCNC: 16.4 MMOL/L (ref 22–29)
CO2 SERPL-SCNC: 20.7 MMOL/L (ref 22–29)
CREAT BLD-MCNC: 0.88 MG/DL (ref 0.76–1.27)
CREAT BLD-MCNC: 1.06 MG/DL (ref 0.76–1.27)
CREAT UR-MCNC: 48.3 MG/DL
DEPRECATED RDW RBC AUTO: 42.4 FL (ref 37–54)
ERYTHROCYTE [DISTWIDTH] IN BLOOD BY AUTOMATED COUNT: 12.1 % (ref 12.3–15.4)
GFR SERPL CREATININE-BSD FRML MDRD: 74 ML/MIN/1.73
GFR SERPL CREATININE-BSD FRML MDRD: 91 ML/MIN/1.73
GLUCOSE BLD-MCNC: 165 MG/DL (ref 65–99)
GLUCOSE BLD-MCNC: 186 MG/DL (ref 65–99)
GLUCOSE BLDC GLUCOMTR-MCNC: 119 MG/DL (ref 70–130)
GLUCOSE BLDC GLUCOMTR-MCNC: 124 MG/DL (ref 70–130)
GLUCOSE BLDC GLUCOMTR-MCNC: 140 MG/DL (ref 70–130)
GLUCOSE BLDC GLUCOMTR-MCNC: 142 MG/DL (ref 70–130)
GLUCOSE BLDC GLUCOMTR-MCNC: 154 MG/DL (ref 70–130)
GLUCOSE BLDC GLUCOMTR-MCNC: 155 MG/DL (ref 70–130)
GLUCOSE BLDC GLUCOMTR-MCNC: 160 MG/DL (ref 70–130)
GLUCOSE BLDC GLUCOMTR-MCNC: 163 MG/DL (ref 70–130)
GLUCOSE BLDC GLUCOMTR-MCNC: 172 MG/DL (ref 70–130)
GLUCOSE BLDC GLUCOMTR-MCNC: 97 MG/DL (ref 70–130)
GLUCOSE BLDC GLUCOMTR-MCNC: 99 MG/DL (ref 70–130)
HCT VFR BLD AUTO: 40.4 % (ref 37.5–51)
HDLC SERPL-MCNC: 47 MG/DL (ref 40–60)
HGB BLD-MCNC: 13.6 G/DL (ref 13–17.7)
LDLC SERPL CALC-MCNC: 66 MG/DL (ref 0–100)
LDLC/HDLC SERPL: 1.4 {RATIO}
MAGNESIUM SERPL-MCNC: 2.4 MG/DL (ref 1.6–2.6)
MAGNESIUM SERPL-MCNC: 2.5 MG/DL (ref 1.6–2.6)
MCH RBC QN AUTO: 32.2 PG (ref 26.6–33)
MCHC RBC AUTO-ENTMCNC: 33.7 G/DL (ref 31.5–35.7)
MCV RBC AUTO: 95.5 FL (ref 79–97)
MICROALBUMIN/CREAT UR: NORMAL MG/G{CREAT}
PHOSPHATE SERPL-MCNC: 2 MG/DL (ref 2.5–4.5)
PHOSPHATE SERPL-MCNC: 3.3 MG/DL (ref 2.5–4.5)
PLATELET # BLD AUTO: 202 10*3/MM3 (ref 140–450)
PMV BLD AUTO: 8.8 FL (ref 6–12)
POTASSIUM BLD-SCNC: 4.6 MMOL/L (ref 3.5–5.2)
POTASSIUM BLD-SCNC: 4.7 MMOL/L (ref 3.5–5.2)
RBC # BLD AUTO: 4.23 10*6/MM3 (ref 4.14–5.8)
SODIUM BLD-SCNC: 132 MMOL/L (ref 136–145)
SODIUM BLD-SCNC: 133 MMOL/L (ref 136–145)
T4 FREE SERPL-MCNC: 1.08 NG/DL (ref 0.93–1.7)
TRIGL SERPL-MCNC: 111 MG/DL (ref 0–150)
TSH SERPL DL<=0.05 MIU/L-ACNC: 0.74 UIU/ML (ref 0.27–4.2)
VLDLC SERPL-MCNC: 22.2 MG/DL (ref 5–40)
WBC NRBC COR # BLD: 7.68 10*3/MM3 (ref 3.4–10.8)

## 2019-10-04 PROCEDURE — 86341 ISLET CELL ANTIBODY: CPT | Performed by: INTERNAL MEDICINE

## 2019-10-04 PROCEDURE — 82570 ASSAY OF URINE CREATININE: CPT | Performed by: INTERNAL MEDICINE

## 2019-10-04 PROCEDURE — 84443 ASSAY THYROID STIM HORMONE: CPT | Performed by: INTERNAL MEDICINE

## 2019-10-04 PROCEDURE — 82043 UR ALBUMIN QUANTITATIVE: CPT | Performed by: INTERNAL MEDICINE

## 2019-10-04 PROCEDURE — 84681 ASSAY OF C-PEPTIDE: CPT | Performed by: INTERNAL MEDICINE

## 2019-10-04 PROCEDURE — 99204 OFFICE O/P NEW MOD 45 MIN: CPT | Performed by: INTERNAL MEDICINE

## 2019-10-04 PROCEDURE — 80048 BASIC METABOLIC PNL TOTAL CA: CPT | Performed by: INTERNAL MEDICINE

## 2019-10-04 PROCEDURE — 83735 ASSAY OF MAGNESIUM: CPT | Performed by: INTERNAL MEDICINE

## 2019-10-04 PROCEDURE — 80061 LIPID PANEL: CPT | Performed by: INTERNAL MEDICINE

## 2019-10-04 PROCEDURE — 84100 ASSAY OF PHOSPHORUS: CPT | Performed by: INTERNAL MEDICINE

## 2019-10-04 PROCEDURE — 84439 ASSAY OF FREE THYROXINE: CPT | Performed by: INTERNAL MEDICINE

## 2019-10-04 PROCEDURE — G0378 HOSPITAL OBSERVATION PER HR: HCPCS

## 2019-10-04 PROCEDURE — 85027 COMPLETE CBC AUTOMATED: CPT | Performed by: INTERNAL MEDICINE

## 2019-10-04 PROCEDURE — 82962 GLUCOSE BLOOD TEST: CPT

## 2019-10-04 PROCEDURE — 96368 THER/DIAG CONCURRENT INF: CPT

## 2019-10-04 PROCEDURE — 96366 THER/PROPH/DIAG IV INF ADDON: CPT

## 2019-10-04 RX ADMIN — SODIUM CHLORIDE, PRESERVATIVE FREE 10 ML: 5 INJECTION INTRAVENOUS at 09:31

## 2019-10-04 RX ADMIN — SODIUM CHLORIDE 11 UNITS/HR: 9 INJECTION, SOLUTION INTRAVENOUS at 03:00

## 2019-10-04 RX ADMIN — POTASSIUM CHLORIDE, DEXTROSE MONOHYDRATE AND SODIUM CHLORIDE 250 ML/HR: 150; 5; 450 INJECTION, SOLUTION INTRAVENOUS at 03:41

## 2019-10-04 RX ADMIN — POTASSIUM CHLORIDE, DEXTROSE MONOHYDRATE AND SODIUM CHLORIDE 250 ML/HR: 150; 5; 450 INJECTION, SOLUTION INTRAVENOUS at 08:33

## 2019-10-04 NOTE — NURSING NOTE
2100- Pt admitted from ER. Pt admitted in computer and in database.   2145- Report to GEORGE Agee RN

## 2019-10-04 NOTE — PLAN OF CARE
Problem: Patient Care Overview  Goal: Plan of Care Review  Outcome: Outcome(s) achieved Date Met: 10/04/19      Problem: Diabetes, Type 1 (Adult)  Goal: Signs and Symptoms of Listed Potential Problems Will be Absent, Minimized or Managed (Diabetes, Type 1)  Outcome: Outcome(s) achieved Date Met: 10/04/19

## 2019-10-04 NOTE — PLAN OF CARE
Problem: Patient Care Overview  Goal: Plan of Care Review  Outcome: Ongoing (interventions implemented as appropriate)   10/04/19 0616   Coping/Psychosocial   Plan of Care Reviewed With patient   Plan of Care Review   Progress improving   OTHER   Outcome Summary Pt arrived from ER in DKA. Slowly resolving. VSS. Voiding per urinal.        Problem: Diabetes, Type 1 (Adult)  Goal: Signs and Symptoms of Listed Potential Problems Will be Absent, Minimized or Managed (Diabetes, Type 1)  Outcome: Ongoing (interventions implemented as appropriate)      Problem: Hyperglycemia, Persistent (Adult)  Goal: Signs and Symptoms of Listed Potential Problems Will be Absent, Minimized or Managed (Hyperglycemia, Persistent)  Outcome: Ongoing (interventions implemented as appropriate)

## 2019-10-04 NOTE — CONSULTS
ENDOCRINOLOGY CONSULTATION    CONSULTING PHYSICIAN: Daniel Carroll MD    REFERRING PHYSICIAN: Eve Barney MD    REASON FOR CONSULTATION: Treatment of diabetes mellitus.    HISTORY OF PRESENT ILLNESS: Patient is 49-year-old male who was admitted to the hospital on 10/03/2019 because of acute onset of shortness of breath, lightheadedness, nausea and vomiting. There is no associated fever, chills, cough, or cold symptoms. He came to the emergency room. Blood sugar was noted to be elevated at 257 with low CO2 of 12.7 and elevated beta hydroxybutyrate. He was admitted to the hospital and started insulin drip and IV fluids. Blood sugar and acidosis have improved. He denies any nausea or vomiting at present and wants to start eating.     He has known diabetes mellitus since 1996 and started on insulin in 1998. He has been on insulin pump for more than 10 years. He is using a Tandem t:slim pump since 2015 with a Dexcom 4 sensor. His basal rates are as follows: 12 midnight is 2.1, 8 a.m. Is 1.9, bolus is 1 unit per 8 g of carbohydrates, sensitivity is 30, target . Hemoglobin A1c done on admission is 7.1%. He was started on Farxiga about 2 weeks ago because his hemoglobin A1c was 7.4%.  He has been having difficulty with the insulin pump site which would malfunction after 1 or 2 days of use.    Hiss last eye examination was 09/2019. He has no retinopathy. He denies any associated nephropathy. He denies numbness, tingling, and burning sensation in his hands and feet.    PAST MEDICAL AND SURGICAL HISTORY:   1.  Hyperlipidemia. Is on Crestor 40 mg per day.  2.  Hypertension. Is on lisinopril 40 mg per day.  3.  Previous colonoscopy.  4.  Left knee surgery.  5.  Nasal septoplasty.  6.  Tonsillectomy.    ALLERGIES: No known drug allergies.    SOCIAL HISTORY: The patient is . He works for [employer]. He denies alcohol, tobacco, or drug abuse.    FAMILY HISTORY: His paternal grandmother had diabetes  mellitus. His father  of a heart attack at age 41. He was a smoker and had hyperlipidemia.    REVIEW OF SYSTEMS: He has intentionally lost 14 pounds since 2019. He denies any nausea, vomiting, or diarrhea. He denies chest pain, palpitations, shortness of breath, cough, or sputum production. He denies melena, hematochezia, hematemesis, hematuria, dysuria. He denies seizures or loss of consciousness.    PHYSICAL EXAMINATION:   VITAL SIGNS: Blood pressure is 113/66, respiratory rate 18, heart rate 80, temperature 98.4° F.  GENERAL: Patient is awake, alert, oriented, not dyspneic, not tachypneic, not orthopneic, not in acute distress.  HEENT: Pink conjunctivae. Sclerae anicteric. No xanthelasma. Full extraocular muscle movement. No facial asymmetry. Speech is fluent. No pharyngeal congestion. No oral thrush.  NECK: No carotid bruits. The thyroid is not enlarged.  LYMPHATIC: No cervical lymphadenopathy.  LUNGS: Equal chest expansion. No rales, no wheezes.  HEART: Regular heart rate and rhythm. No gallop, no murmur, no rubs.  ABDOMEN: Soft, nontender. Bowel sounds are active.  EXTREMITIES: Warm. No cyanosis, pedal edema or clubbing. Light touch sensation is grossly intact. There are no xanthomas.    IMPRESSION:  1.  Diabetic ketoacidosis, resolving.  2.  Type 2 diabetes mellitus.  3.  Hyperlipidemia.  4.  Hypertension.    RECOMMENDATION:   1.  Patient's acidosis has significantly improved. He can switch back to the insulin pump and self-manage the pump. He can continue on Dexcom 4 glucose sensor. I have advised the patient to consider switching to the Dexcom 6 sensor which does not need calibration. Check thyroid function test, check lipid profile, check microalbumin. Restart Crestor.   2.  I will defer decision on when to restart lisinopril to Dr. Barney.    Thank you for the referral. I will follow the patient with you during his hospital stay.    Copy of my note sent to Dr. Dima Hawkins

## 2019-10-04 NOTE — NURSING NOTE
Patient sitting in chair, resting. Resp even and unlabored.Skin warm and dry. Reviewed AVS with patient and wife. Patient and wife deny questions, verbalize understanding of teachings. PIV removed x2, pressure applied, dry dressing applied. No bleeding noted. Patient has all of his belongings. Latest blood sugar of 117. Patient states he feels much better. Insulin pump and sensor on and functioning as expected. Patient to resume his previous insulin pump regimen. Patient to follow up with endocrine in 3-4 weeks. Patient wheeled out to car in w/c with belongings and AVS, denies any discomfort.

## 2019-10-04 NOTE — DISCHARGE SUMMARY
PHYSICIAN DISCHARGE SUMMARY                                                                          [facility]    Patient Identification:  Name: Jet Deras  Age: 49 y.o.  Sex: male  :  1970  MRN: [id number]  Primary Care Physician: Dima Hawkins MD    Admit date: 10/3/2019  Discharge date:10/4/2019  Discharged Condition: good    Discharge Diagnoses:    Type 1 diabetes mellitus with ketoacidosis without coma (CMS/HCC)    Hyperlipidemia    Essential hypertension    Insulin pump status       Hospital Course:   Is a very nice 49-year-old male who was started on Farxiga but a week ago.  He subsequently presented to the emergency room with DKA.  He was started on insulin drip he was given IV fluids.  His gap closed.  He was awake alert with sugars between 100-130 tolerating 2 meals prior to discharge.    Consults:   IP CONSULT TO NUTRITION SERVICES  IP CONSULT TO DIABETES EDUCATOR  IP CONSULT TO PULMONOLOGY  IP CONSULT TO ENDOCRINOLOGY    Consults:   IP CONSULT TO NUTRITION SERVICES  IP CONSULT TO DIABETES EDUCATOR  IP CONSULT TO PULMONOLOGY  IP CONSULT TO ENDOCRINOLOGY    Significant Discharge Diagnostics   Procedures Performed:         Pertinent Lab Results:  Results from last 7 days   Lab Units 10/04/19  0600 10/04/19  0201 10/03/19  2226 10/03/19  1744   SODIUM mmol/L 133* 132* 134*  131* 128*  127*   POTASSIUM mmol/L 4.6 4.7 4.9  4.7 5.5*  5.4*   CHLORIDE mmol/L 100 97* 92*  89* 82*  82*   CO2 mmol/L 20.7* 16.4* 7.1*  9.5* 10.8*  12.6*   BUN mg/dL 18 23* 27*  28* 25*  24*   CREATININE mg/dL 0.88 1.06 1.30*  1.30* 1.12  1.14   GLUCOSE mg/dL 186* 165* 228*  230* 264*  257*   CALCIUM mg/dL 7.7* 8.0* 8.4*  8.6 10.1  10.0   AST (SGOT) U/L  --   --   --  41*   ALT (SGPT) U/L  --   --   --  52*     Results from last 7 days   Lab Units 10/03/19  1744   TROPONIN T ng/mL <0.010     Results from last 7 days   Lab Units  10/04/19  0600 10/03/19  1744   WBC 10*3/mm3 7.68 9.79   HEMOGLOBIN g/dL 13.6 15.6   HEMATOCRIT % 40.4 47.7   PLATELETS 10*3/mm3 202 246   MCV fL 95.5 97.9*   MCH pg 32.2 32.0   MCHC g/dL 33.7 32.7   RDW % 12.1* 11.9*   RDW-SD fl 42.4 43.1   MPV fL 8.8 9.2   NEUTROPHIL % %  --  81.4*   LYMPHOCYTE % %  --  12.2*   MONOCYTES % %  --  5.1   EOSINOPHIL % %  --  0.0*   BASOPHIL % %  --  0.5   IMM GRAN % %  --  0.8*   NEUTROS ABS 10*3/mm3  --  7.97*   LYMPHS ABS 10*3/mm3  --  1.19   MONOS ABS 10*3/mm3  --  0.50   EOS ABS 10*3/mm3  --  0.00   BASOS ABS 10*3/mm3  --  0.05   IMMATURE GRANS (ABS) 10*3/mm3  --  0.08*   NRBC /100 WBC  --  0.0         Results from last 7 days   Lab Units 10/04/19  0600 10/04/19  0201 10/03/19  2226 10/03/19  2026 10/03/19  1744   MAGNESIUM mg/dL 2.4 2.5 2.8* 2.4 2.6     Results from last 7 days   Lab Units 10/04/19  0600   CHOLESTEROL mg/dL 135   TRIGLYCERIDES mg/dL 111   HDL CHOL mg/dL 47     Results from last 7 days   Lab Units 10/03/19  1744   PROBNP pg/mL 19.5     Results from last 7 days   Lab Units 10/04/19  0600   TSH uIU/mL 0.741                         Results from last 7 days   Lab Units 10/03/19  2005   NITRITE UA  Negative   WBC UA /HPF 0-2   BACTERIA UA /HPF None Seen   SQUAM EPITHEL UA /HPF 0-2         Results from last 7 days   Lab Units 10/04/19  1049   CREATININE UR mg/dL 48.3       Imaging Results:  Imaging Results (all)     Procedure Component Value Units Date/Time    XR Chest 2 View [[id number]] Collected:  10/03/19 1848     Updated:  10/03/19 1853    Narrative:       AP AND LATERAL CHEST     HISTORY: Shortness of air. Sudden onset of diaphoresis.     COMPARISON: PA and lateral chest 05/05/2011.     FINDINGS: Cardiomediastinal silhouette is within normal limits. Lungs  appear clear and there is no evidence for pulmonary edema or pleural  effusion or infiltrate or active disease in the chest.       Impression:       No acute disease.     This report was finalized on 10/3/2019  "6:50 PM by Dr. Colin Giles M.D.             Discharge Exam:  /67 (BP Location: Left arm, Patient Position: Lying)   Pulse 82   Temp 98.2 °F (36.8 °C) (Oral)   Resp 16   Ht 193 cm (76\")   Wt 103 kg (226 lb 10.1 oz)   SpO2 96%   BMI 27.59 kg/m²   GENERAL:  NAD, Aox3  HEENT:  EOMI, nonicteric sclera, no JVD  PULMONARY:    CTAB, no wheeze, no crackles, no rhonchi, symmetric air entry  CARDIAC:  RRR no MRG, S1 S2  ABD: SNTND BS+  EXT: no c/c/e, pulses symmetric 2+ bilaterally  NEURO:  CNs II-XII intact, no focal deficits    Discharge Disposition  Home or Self Care    Discharge Medications     Discharge Medications      Continue These Medications      Instructions Start Date   CRESTOR 40 MG tablet  Generic drug:  rosuvastatin   40 mg, Oral, Daily      insulin aspart 100 UNIT/ML injection  Commonly known as:  novoLOG   Subcutaneous, 3 Times Daily Before Meals, PER SS      lisinopril 40 MG tablet  Commonly known as:  PRINIVIL,ZESTRIL   40 mg, Oral, Daily      multivitamin with minerals tablet tablet   1 tablet, Oral, Daily         Stop These Medications    FARXIGA 5 MG tablet tablet  Generic drug:  dapagliflozin            Discharge Diet: diabetic diet    Activity at Discharge:  as tolerated    Follow-up Information     Daniel Carroll MD Follow up in 3 week(s).    Specialty:  Endocrinology  Why:  Follow up in the office in 3 - 4 weeks. Call for an appointment.  Contact information:  [address] [facility] [address]  [place] [address]  [phone]             Dima Hawkins MD .    Specialty:  Internal Medicine  Contact information:  [address] [facility] [address]  [place] [address]  [phone]                   Total time spent discharging patient including evaluation, medication reconciliation, arranging follow up, and post hospitalization instructions and education total time exceeds 30 minutes.    Signed:  Anthony Fried MD  10/4/2019  2:00 PM    "

## 2019-10-05 LAB — C PEPTIDE SERPL-MCNC: <0.1 NG/ML (ref 1.1–4.4)

## 2019-10-07 LAB
ATMOSPHERIC PRESS: 750.1 MMHG
BASE EXCESS BLDV CALC-SCNC: -14.1 MMOL/L
BDY SITE: ABNORMAL
GAD65 AB SER-ACNC: <5 U/ML (ref 0–5)
HCO3 BLDV-SCNC: 10.3 MMOL/L (ref 22–28)
MODALITY: ABNORMAL
PCO2 BLDV: 22.3 MM HG (ref 41–51)
PH BLDV: 7.27 PH UNITS (ref 7.31–7.41)
PO2 BLDV: 66.3 MM HG (ref 35–45)
SAO2 % BLDCOA: 90.6 % (ref 92–99)

## 2020-01-15 NOTE — PROGRESS NOTES
Subjective   Jet Deras is a 49 y.o. male.     Hospital f/u for 2, hyperlipidemia, hypertension/ testing bs  6 x day / last dm eye exam none  / last dm foot exam today with dr Carroll / flu vaccine @ [facility]         He has known diabetes mellitus since 1996 and started on insulin in 1998. He has been on insulin pump for more than 10 years. He is using a Tandem T-slim pump since 2015 with a Dexcom 4 sensor. His basal rates are as follows: 12 midnight is 2.1, 8 a.m. Is 1.9, bolus is 1 unit per 8 g of carbohydrates, sensitivity is 30, target . Hemoglobin A1c done on 10/19 was 7.1%.  His last meal was 11 AM.     Hiss last eye examination was 09/2019. He has no retinopathy. He denies any associated nephropathy.  Urine microalbumin in October 2019 is normal.  He denies numbness, tingling, and burning sensation in his hands and feet.    He has hyperlipidemia is on Crestor 40 mg/day.  He denies myalgia.    He has hypertension since 2017.  He is on lisinopril.  He has no previous history of heart attack or stroke.    He had flu vac last fall.  He has not had pneumococcal vaccine.      The following portions of the patient's history were reviewed and updated as appropriate: allergies, current medications, past family history, past medical history, past social history, past surgical history and problem list.    Review of Systems   Constitutional: Negative.    HENT: Negative.    Eyes: Negative.    Respiratory: Negative.  Negative for chest tightness and shortness of breath.    Cardiovascular: Negative.  Negative for chest pain and palpitations.   Gastrointestinal: Negative.    Endocrine: Negative for cold intolerance and heat intolerance.   Genitourinary: Negative.    Musculoskeletal: Negative for arthralgias and myalgias.   Neurological: Negative for weakness and numbness.   Hematological: Negative for adenopathy. Does not bruise/bleed easily.     Objective      Vitals:    01/22/20 1536   BP: 112/80   BP Location: Left  "arm   Patient Position: Sitting   Cuff Size: Large Adult   Pulse: 74   SpO2: 98%   Weight: 110 kg (242 lb 6.4 oz)   Height: 193 cm (75.98\")     Physical Exam   Constitutional: He is oriented to person, place, and time. He appears well-developed and well-nourished. No distress.   HENT:   Head: Normocephalic.   Right Ear: External ear normal.   Left Ear: External ear normal.   Nose: Nose normal.   Mouth/Throat: Oropharynx is clear and moist. No oropharyngeal exudate.   Eyes: Conjunctivae and EOM are normal. Right eye exhibits no discharge. Left eye exhibits no discharge. No scleral icterus.   Neck: Neck supple. No JVD present. No tracheal deviation present. No thyromegaly present.   Cardiovascular: Normal rate, regular rhythm, normal heart sounds and intact distal pulses. Exam reveals no gallop and no friction rub.   No murmur heard.  Pulmonary/Chest: Effort normal and breath sounds normal. No stridor. No respiratory distress. He has no wheezes. He has no rales. He exhibits no tenderness.   Abdominal: Soft. Bowel sounds are normal. He exhibits no distension and no mass. There is no tenderness. There is no rebound and no guarding. No hernia.   Musculoskeletal: Normal range of motion. He exhibits no edema, tenderness or deformity.   Lymphadenopathy:     He has no cervical adenopathy.   Neurological: He is alert and oriented to person, place, and time. He displays normal reflexes.   Intact light touch on lower ext   Psychiatric: He has a normal mood and affect. His behavior is normal.     Admission on 10/03/2019, Discharged on 10/04/2019   Component Date Value Ref Range Status   • Glucose 10/03/2019 257* 65 - 99 mg/dL Final   • BUN 10/03/2019 24* 6 - 20 mg/dL Final   • Creatinine 10/03/2019 1.14  0.76 - 1.27 mg/dL Final   • Sodium 10/03/2019 127* 136 - 145 mmol/L Final   • Potassium 10/03/2019 5.4* 3.5 - 5.2 mmol/L Final   • Chloride 10/03/2019 82* 98 - 107 mmol/L Final   • CO2 10/03/2019 12.6* 22.0 - 29.0 mmol/L Final "   • Calcium 10/03/2019 10.0  8.6 - 10.5 mg/dL Final   • eGFR Non African Amer 10/03/2019 68  >60 mL/min/1.73 Final   • BUN/Creatinine Ratio 10/03/2019 21.1  7.0 - 25.0 Final   • Anion Gap 10/03/2019 32.4* 5.0 - 15.0 mmol/L Final   • proBNP 10/03/2019 19.5  5.0 - 900.0 pg/mL Final   • Troponin T 10/03/2019 <0.010  0.000 - 0.030 ng/mL Final   • WBC 10/03/2019 9.79  3.40 - 10.80 10*3/mm3 Final   • RBC 10/03/2019 4.87  4.14 - 5.80 10*6/mm3 Final   • Hemoglobin 10/03/2019 15.6  13.0 - 17.7 g/dL Final   • Hematocrit 10/03/2019 47.7  37.5 - 51.0 % Final   • MCV 10/03/2019 97.9* 79.0 - 97.0 fL Final   • MCH 10/03/2019 32.0  26.6 - 33.0 pg Final   • MCHC 10/03/2019 32.7  31.5 - 35.7 g/dL Final   • RDW 10/03/2019 11.9* 12.3 - 15.4 % Final   • RDW-SD 10/03/2019 43.1  37.0 - 54.0 fl Final   • MPV 10/03/2019 9.2  6.0 - 12.0 fL Final   • Platelets 10/03/2019 246  140 - 450 10*3/mm3 Final   • Neutrophil % 10/03/2019 81.4* 42.7 - 76.0 % Final   • Lymphocyte % 10/03/2019 12.2* 19.6 - 45.3 % Final   • Monocyte % 10/03/2019 5.1  5.0 - 12.0 % Final   • Eosinophil % 10/03/2019 0.0* 0.3 - 6.2 % Final   • Basophil % 10/03/2019 0.5  0.0 - 1.5 % Final   • Immature Grans % 10/03/2019 0.8* 0.0 - 0.5 % Final   • Neutrophils, Absolute 10/03/2019 7.97* 1.70 - 7.00 10*3/mm3 Final   • Lymphocytes, Absolute 10/03/2019 1.19  0.70 - 3.10 10*3/mm3 Final   • Monocytes, Absolute 10/03/2019 0.50  0.10 - 0.90 10*3/mm3 Final   • Eosinophils, Absolute 10/03/2019 0.00  0.00 - 0.40 10*3/mm3 Final   • Basophils, Absolute 10/03/2019 0.05  0.00 - 0.20 10*3/mm3 Final   • Immature Grans, Absolute 10/03/2019 0.08* 0.00 - 0.05 10*3/mm3 Final   • nRBC 10/03/2019 0.0  0.0 - 0.2 /100 WBC Final   • Color, UA 10/03/2019 Yellow  Yellow, Straw Final   • Appearance, UA 10/03/2019 Clear  Clear Final   • pH, UA 10/03/2019 <=5.0  5.0 - 8.0 Final   • Specific Gravity, UA 10/03/2019 1.028  1.005 - 1.030 Final   • Glucose, UA 10/03/2019 >=1000 mg/dL (3+)* Negative Final   •  Ketones, UA 10/03/2019 80 mg/dL (3+)* Negative Final   • Bilirubin, UA 10/03/2019 Negative  Negative Final   • Blood, UA 10/03/2019 Negative  Negative Final   • Protein, UA 10/03/2019 100 mg/dL (2+)* Negative Final   • Leuk Esterase, UA 10/03/2019 Negative  Negative Final   • Nitrite, UA 10/03/2019 Negative  Negative Final   • Urobilinogen, UA 10/03/2019 0.2 E.U./dL  0.2 - 1.0 E.U./dL Final   • Beta-Hydroxybutyrate Quant 10/03/2019 10.794* 0.020 - 0.270 mmol/L Final   • Site 10/03/2019 N/A   Final   • pH, Venous 10/03/2019 7.271* 7.310 - 7.410 pH Units Final   • pCO2, Venous 10/03/2019 22.3* 41.0 - 51.0 mm Hg Final   • pO2, Venous 10/03/2019 66.3* 35.0 - 45.0 mm Hg Final   • HCO3, Venous 10/03/2019 10.3* 22.0 - 28.0 mmol/L Final   • Base Excess, Venous 10/03/2019 -14.1  mmol/L Final   • O2 Saturation Calculated 10/03/2019 90.6* 92.0 - 99.0 % Final   • Barometric Pressure for Blood Gas 10/03/2019 750.1  mmHg Final   • Modality 10/03/2019 N/A   Final   • Glucose 10/03/2019 264* 65 - 99 mg/dL Final   • BUN 10/03/2019 25* 6 - 20 mg/dL Final   • Creatinine 10/03/2019 1.12  0.76 - 1.27 mg/dL Final   • Sodium 10/03/2019 128* 136 - 145 mmol/L Final   • Potassium 10/03/2019 5.5* 3.5 - 5.2 mmol/L Final   • Chloride 10/03/2019 82* 98 - 107 mmol/L Final   • CO2 10/03/2019 10.8* 22.0 - 29.0 mmol/L Final   • Calcium 10/03/2019 10.1  8.6 - 10.5 mg/dL Final   • Total Protein 10/03/2019 7.3  6.0 - 8.5 g/dL Final   • Albumin 10/03/2019 4.60  3.50 - 5.20 g/dL Final   • ALT (SGPT) 10/03/2019 52* 1 - 41 U/L Final   • AST (SGOT) 10/03/2019 41* 1 - 40 U/L Final   • Alkaline Phosphatase 10/03/2019 92  39 - 117 U/L Final   • Total Bilirubin 10/03/2019 0.6  0.2 - 1.2 mg/dL Final   • eGFR Non African Amer 10/03/2019 69  >60 mL/min/1.73 Final   • Globulin 10/03/2019 2.7  gm/dL Final   • A/G Ratio 10/03/2019 1.7  g/dL Final   • BUN/Creatinine Ratio 10/03/2019 22.3  7.0 - 25.0 Final   • Anion Gap 10/03/2019 35.2* 5.0 - 15.0 mmol/L Final   •  Phosphorus 10/03/2019 5.3* 2.5 - 4.5 mg/dL Final   • Magnesium 10/03/2019 2.6  1.6 - 2.6 mg/dL Final   • Osmolality 10/03/2019 305* 275 - 300 mOsm/kg Final   • Hemoglobin A1C 10/03/2019 7.10* 4.80 - 5.60 % Final   • Magnesium 10/03/2019 2.4  1.6 - 2.6 mg/dL Final   • Phosphorus 10/03/2019 5.6* 2.5 - 4.5 mg/dL Final   • Phosphorus 10/03/2019 5.7* 2.5 - 4.5 mg/dL Final   • Glucose 10/03/2019 288* 70 - 130 mg/dL Final   • RBC, UA 10/03/2019 0-2  None Seen, 0-2 /HPF Final   • WBC, UA 10/03/2019 0-2  None Seen, 0-2 /HPF Final   • Bacteria, UA 10/03/2019 None Seen  None Seen /HPF Final   • Squamous Epithelial Cells, UA 10/03/2019 0-2  None Seen, 0-2 /HPF Final   • Hyaline Casts, UA 10/03/2019 0-2  None Seen /LPF Final   • Methodology 10/03/2019 Manual Light Microscopy   Final   • Glucose 10/03/2019 264* 70 - 130 mg/dL Final   • Glucose 10/03/2019 228* 65 - 99 mg/dL Final   • BUN 10/03/2019 27* 6 - 20 mg/dL Final   • Creatinine 10/03/2019 1.30* 0.76 - 1.27 mg/dL Final   • Sodium 10/03/2019 134* 136 - 145 mmol/L Final   • Potassium 10/03/2019 4.9  3.5 - 5.2 mmol/L Final   • Chloride 10/03/2019 92* 98 - 107 mmol/L Final   • CO2 10/03/2019 7.1* 22.0 - 29.0 mmol/L Final   • Calcium 10/03/2019 8.4* 8.6 - 10.5 mg/dL Final   • eGFR Non African Amer 10/03/2019 58* >60 mL/min/1.73 Final   • BUN/Creatinine Ratio 10/03/2019 20.8  7.0 - 25.0 Final   • Anion Gap 10/03/2019 34.9* 5.0 - 15.0 mmol/L Final   • Glucose 10/03/2019 234* 70 - 130 mg/dL Final   • Glucose 10/03/2019 230* 65 - 99 mg/dL Final   • BUN 10/03/2019 28* 6 - 20 mg/dL Final   • Creatinine 10/03/2019 1.30* 0.76 - 1.27 mg/dL Final   • Sodium 10/03/2019 131* 136 - 145 mmol/L Final   • Potassium 10/03/2019 4.7  3.5 - 5.2 mmol/L Final   • Chloride 10/03/2019 89* 98 - 107 mmol/L Final   • CO2 10/03/2019 9.5* 22.0 - 29.0 mmol/L Final   • Calcium 10/03/2019 8.6  8.6 - 10.5 mg/dL Final   • eGFR Non African Amer 10/03/2019 58* >60 mL/min/1.73 Final   • BUN/Creatinine Ratio  10/03/2019 21.5  7.0 - 25.0 Final   • Anion Gap 10/03/2019 32.5* 5.0 - 15.0 mmol/L Final   • Magnesium 10/03/2019 2.8* 1.6 - 2.6 mg/dL Final   • Magnesium 10/04/2019 2.5  1.6 - 2.6 mg/dL Final   • Phosphorus 10/04/2019 3.3  2.5 - 4.5 mg/dL Final   • Glucose 10/04/2019 165* 65 - 99 mg/dL Final   • BUN 10/04/2019 23* 6 - 20 mg/dL Final   • Creatinine 10/04/2019 1.06  0.76 - 1.27 mg/dL Final   • Sodium 10/04/2019 132* 136 - 145 mmol/L Final   • Potassium 10/04/2019 4.7  3.5 - 5.2 mmol/L Final   • Chloride 10/04/2019 97* 98 - 107 mmol/L Final   • CO2 10/04/2019 16.4* 22.0 - 29.0 mmol/L Final   • Calcium 10/04/2019 8.0* 8.6 - 10.5 mg/dL Final   • eGFR Non African Amer 10/04/2019 74  >60 mL/min/1.73 Final   • BUN/Creatinine Ratio 10/04/2019 21.7  7.0 - 25.0 Final   • Anion Gap 10/04/2019 18.6* 5.0 - 15.0 mmol/L Final   • Glucose 10/03/2019 178* 70 - 130 mg/dL Final   • WBC 10/04/2019 7.68  3.40 - 10.80 10*3/mm3 Final   • RBC 10/04/2019 4.23  4.14 - 5.80 10*6/mm3 Final   • Hemoglobin 10/04/2019 13.6  13.0 - 17.7 g/dL Final   • Hematocrit 10/04/2019 40.4  37.5 - 51.0 % Final   • MCV 10/04/2019 95.5  79.0 - 97.0 fL Final   • MCH 10/04/2019 32.2  26.6 - 33.0 pg Final   • MCHC 10/04/2019 33.7  31.5 - 35.7 g/dL Final   • RDW 10/04/2019 12.1* 12.3 - 15.4 % Final   • RDW-SD 10/04/2019 42.4  37.0 - 54.0 fl Final   • MPV 10/04/2019 8.8  6.0 - 12.0 fL Final   • Platelets 10/04/2019 202  140 - 450 10*3/mm3 Final   • Magnesium 10/04/2019 2.4  1.6 - 2.6 mg/dL Final   • Phosphorus 10/04/2019 2.0* 2.5 - 4.5 mg/dL Final   • Glucose 10/04/2019 186* 65 - 99 mg/dL Final   • BUN 10/04/2019 18  6 - 20 mg/dL Final   • Creatinine 10/04/2019 0.88  0.76 - 1.27 mg/dL Final   • Sodium 10/04/2019 133* 136 - 145 mmol/L Final   • Potassium 10/04/2019 4.6  3.5 - 5.2 mmol/L Final   • Chloride 10/04/2019 100  98 - 107 mmol/L Final   • CO2 10/04/2019 20.7* 22.0 - 29.0 mmol/L Final   • Calcium 10/04/2019 7.7* 8.6 - 10.5 mg/dL Final   • eGFR Non African Amer  10/04/2019 91  >60 mL/min/1.73 Final   • BUN/Creatinine Ratio 10/04/2019 20.5  7.0 - 25.0 Final   • Anion Gap 10/04/2019 12.3  5.0 - 15.0 mmol/L Final   • Glucose 10/03/2019 160* 70 - 130 mg/dL Final   • Glucose 10/04/2019 163* 70 - 130 mg/dL Final   • Glucose 10/04/2019 155* 70 - 130 mg/dL Final   • Glucose 10/04/2019 154* 70 - 130 mg/dL Final   • Glucose 10/04/2019 142* 70 - 130 mg/dL Final   • Glucose 10/04/2019 140* 70 - 130 mg/dL Final   • Glucose 10/04/2019 172* 70 - 130 mg/dL Final   • Glucose 10/04/2019 124  70 - 130 mg/dL Final   • Glucose 10/04/2019 119  70 - 130 mg/dL Final   • Free T4 10/04/2019 1.08  0.93 - 1.70 ng/dL Final   • TSH 10/04/2019 0.741  0.270 - 4.200 uIU/mL Final   • Total Cholesterol 10/04/2019 135  0 - 200 mg/dL Final   • Triglycerides 10/04/2019 111  0 - 150 mg/dL Final   • HDL Cholesterol 10/04/2019 47  40 - 60 mg/dL Final   • LDL Cholesterol  10/04/2019 66  0 - 100 mg/dL Final   • VLDL Cholesterol 10/04/2019 22.2  5 - 40 mg/dL Final   • LDL/HDL Ratio 10/04/2019 1.40   Final   • Microalbumin/Creatinine Ratio 10/04/2019    Final    Unable to calculate   • Creatinine, Urine 10/04/2019 48.3  mg/dL Final   • Microalbumin, Urine 10/04/2019 <1.2  mg/dL Final   • Glucose 10/04/2019 97  70 - 130 mg/dL Final   • Glucose 10/04/2019 99  70 - 130 mg/dL Final   • C-Peptide 10/04/2019 <0.1* 1.1 - 4.4 ng/mL Final    C-Peptide reference interval is for fasting patients.   • PATRIZIA-65 10/04/2019 <5.0  0.0 - 5.0 U/mL Final     Assessment/Plan   Jet was seen today for hyperlipidemia and hypertension.    Diagnoses and all orders for this visit:    Type 1 diabetes mellitus with ketoacidosis without coma (CMS/[facility])  -     Comprehensive Metabolic Panel  -     Lipid Panel  -     Hemoglobin A1c  -     TSH    Insulin pump status    Hyperlipidemia, unspecified hyperlipidemia type  -     Comprehensive Metabolic Panel  -     Lipid Panel  -     TSH    Essential hypertension  -     Comprehensive Metabolic  Panel    Other orders  -     Glucagon (GVOKE PFS) 1 MG/0.2ML solution prefilled syringe; Inject 1 mg under the skin into the appropriate area as directed As Needed (hypoglycemia).      Continuing current insulin pump setting.  Discussed about Medtronic 670 G pump, tandem T slim and Omnipod.  Discussed about Dexcom 6 sensors.  Continue Crestor 40 mg/day.  Continue lisinopril 40 mg/day.    Copy of my note to Dr. Hawkins    RTC 4 mos.

## 2020-01-22 ENCOUNTER — OFFICE VISIT (OUTPATIENT)
Dept: ENDOCRINOLOGY | Age: 50
End: 2020-01-22

## 2020-01-22 VITALS
OXYGEN SATURATION: 98 % | HEIGHT: 76 IN | BODY MASS INDEX: 29.52 KG/M2 | SYSTOLIC BLOOD PRESSURE: 112 MMHG | HEART RATE: 74 BPM | DIASTOLIC BLOOD PRESSURE: 80 MMHG | WEIGHT: 242.4 LBS

## 2020-01-22 DIAGNOSIS — Z96.41 INSULIN PUMP STATUS: ICD-10-CM

## 2020-01-22 DIAGNOSIS — E10.10 TYPE 1 DIABETES MELLITUS WITH KETOACIDOSIS WITHOUT COMA (HCC): Primary | ICD-10-CM

## 2020-01-22 DIAGNOSIS — I10 ESSENTIAL HYPERTENSION: ICD-10-CM

## 2020-01-22 DIAGNOSIS — E78.5 HYPERLIPIDEMIA, UNSPECIFIED HYPERLIPIDEMIA TYPE: ICD-10-CM

## 2020-01-22 PROCEDURE — 99214 OFFICE O/P EST MOD 30 MIN: CPT | Performed by: INTERNAL MEDICINE

## 2020-01-23 RX ORDER — GLUCAGON INJECTION, SOLUTION 1 MG/.2ML
1 INJECTION, SOLUTION SUBCUTANEOUS AS NEEDED
Qty: 1 SYRINGE | Refills: 5 | Status: SHIPPED | OUTPATIENT
Start: 2020-01-23 | End: 2020-01-28 | Stop reason: ALTCHOICE

## 2020-01-28 RX ORDER — IBUPROFEN 600 MG/1
1 TABLET ORAL ONCE AS NEEDED
Qty: 1 KIT | Refills: 1 | Status: SHIPPED | OUTPATIENT
Start: 2020-01-28

## 2020-03-20 ENCOUNTER — TELEPHONE (OUTPATIENT)
Dept: ENDOCRINOLOGY | Age: 50
End: 2020-03-20

## 2021-04-12 RX ORDER — INSULIN DETEMIR 100 [IU]/ML
INJECTION, SOLUTION SUBCUTANEOUS
Qty: 55 ML | Refills: 0 | OUTPATIENT
Start: 2021-04-12

## 2022-06-30 RX ORDER — ROSUVASTATIN CALCIUM 40 MG/1
TABLET, COATED ORAL
COMMUNITY
End: 2022-09-19 | Stop reason: SDUPTHER

## 2022-06-30 RX ORDER — TADALAFIL 20 MG/1
TABLET ORAL
COMMUNITY

## 2022-06-30 RX ORDER — INSULIN DETEMIR 100 [IU]/ML
INJECTION, SOLUTION SUBCUTANEOUS
COMMUNITY
End: 2022-09-19 | Stop reason: SDUPTHER

## 2022-06-30 RX ORDER — LISINOPRIL 40 MG/1
TABLET ORAL
COMMUNITY
End: 2022-09-19 | Stop reason: SDUPTHER

## 2022-09-19 PROBLEM — E78.5 HYPERLIPIDEMIA: Status: ACTIVE | Noted: 2022-09-19

## 2022-09-19 PROBLEM — R93.3 ABNORMAL COLONOSCOPY: Status: ACTIVE | Noted: 2022-09-19

## 2022-09-19 PROBLEM — E10.9 TYPE 1 DIABETES MELLITUS WITHOUT COMPLICATION (HCC): Status: ACTIVE | Noted: 2022-09-19

## 2022-09-19 PROBLEM — I10 BENIGN ESSENTIAL HYPERTENSION: Status: ACTIVE | Noted: 2022-09-19

## 2022-09-19 PROBLEM — N52.9 ERECTILE DYSFUNCTION: Status: ACTIVE | Noted: 2022-09-19

## 2022-09-19 PROBLEM — R74.01 ELEVATED LIVER TRANSAMINASE LEVEL: Status: ACTIVE | Noted: 2022-09-19

## (undated) DEVICE — SINGLE-USE BIOPSY FORCEPS: Brand: RADIAL JAW 4

## (undated) DEVICE — CANNULA,ADULT,SOFT-TOUCH,7'TUBE,UC: Brand: PENDING

## (undated) DEVICE — Device: Brand: DEFENDO AIR/WATER/SUCTION AND BIOPSY VALVE

## (undated) DEVICE — TUBING, SUCTION, 1/4" X 10', STRAIGHT: Brand: MEDLINE

## (undated) DEVICE — THE TORRENT IRRIGATION SCOPE CONNECTOR IS USED WITH THE TORRENT IRRIGATION TUBING TO PROVIDE IRRIGATION FLUIDS SUCH AS STERILE WATER DURING GASTROINTESTINAL ENDOSCOPIC PROCEDURES WHEN USED IN CONJUNCTION WITH AN IRRIGATION PUMP (OR ELECTROSURGICAL UNIT).: Brand: TORRENT